# Patient Record
Sex: MALE | Race: WHITE | NOT HISPANIC OR LATINO | URBAN - NONMETROPOLITAN AREA
[De-identification: names, ages, dates, MRNs, and addresses within clinical notes are randomized per-mention and may not be internally consistent; named-entity substitution may affect disease eponyms.]

---

## 2020-08-04 ENCOUNTER — TRANSCRIBE ORDERS (OUTPATIENT)
Dept: ADMINISTRATIVE | Facility: HOSPITAL | Age: 50
End: 2020-08-04

## 2020-08-04 DIAGNOSIS — Z01.818 PREOP EXAMINATION: Primary | ICD-10-CM

## 2020-08-05 ENCOUNTER — LAB (OUTPATIENT)
Dept: LAB | Facility: HOSPITAL | Age: 50
End: 2020-08-05

## 2020-08-05 DIAGNOSIS — Z01.818 PREOP EXAMINATION: ICD-10-CM

## 2020-08-05 PROCEDURE — C9803 HOPD COVID-19 SPEC COLLECT: HCPCS

## 2020-08-05 PROCEDURE — U0004 COV-19 TEST NON-CDC HGH THRU: HCPCS

## 2020-08-05 PROCEDURE — U0002 COVID-19 LAB TEST NON-CDC: HCPCS

## 2020-08-06 LAB
REF LAB TEST METHOD: NORMAL
SARS-COV-2 RNA RESP QL NAA+PROBE: NOT DETECTED

## 2024-08-26 ENCOUNTER — APPOINTMENT (OUTPATIENT)
Dept: GENERAL RADIOLOGY | Facility: HOSPITAL | Age: 54
End: 2024-08-26
Payer: COMMERCIAL

## 2024-08-26 ENCOUNTER — APPOINTMENT (OUTPATIENT)
Dept: CT IMAGING | Facility: HOSPITAL | Age: 54
End: 2024-08-26
Payer: COMMERCIAL

## 2024-08-26 ENCOUNTER — HOSPITAL ENCOUNTER (EMERGENCY)
Facility: HOSPITAL | Age: 54
Discharge: HOME | End: 2024-08-26
Attending: EMERGENCY MEDICINE | Admitting: EMERGENCY MEDICINE
Payer: COMMERCIAL

## 2024-08-26 VITALS
TEMPERATURE: 97.6 F | DIASTOLIC BLOOD PRESSURE: 85 MMHG | HEART RATE: 114 BPM | OXYGEN SATURATION: 96 % | HEIGHT: 72 IN | WEIGHT: 226 LBS | BODY MASS INDEX: 30.61 KG/M2 | RESPIRATION RATE: 18 BRPM | SYSTOLIC BLOOD PRESSURE: 129 MMHG

## 2024-08-26 DIAGNOSIS — I48.0 PAROXYSMAL ATRIAL FIBRILLATION: Primary | ICD-10-CM

## 2024-08-26 LAB
ALBUMIN SERPL-MCNC: 4.3 G/DL (ref 3.5–5.2)
ALBUMIN/GLOB SERPL: 2 G/DL
ALP SERPL-CCNC: 59 U/L (ref 39–117)
ALT SERPL W P-5'-P-CCNC: 22 U/L (ref 1–41)
ANION GAP SERPL CALCULATED.3IONS-SCNC: 9 MMOL/L (ref 5–15)
AST SERPL-CCNC: 15 U/L (ref 1–40)
BASOPHILS # BLD AUTO: 0.03 10*3/MM3 (ref 0–0.2)
BASOPHILS NFR BLD AUTO: 0.5 % (ref 0–1.5)
BILIRUB SERPL-MCNC: 0.2 MG/DL (ref 0–1.2)
BUN SERPL-MCNC: 21 MG/DL (ref 6–20)
BUN/CREAT SERPL: 14.4 (ref 7–25)
CALCIUM SPEC-SCNC: 9.3 MG/DL (ref 8.6–10.5)
CHLORIDE SERPL-SCNC: 102 MMOL/L (ref 98–107)
CO2 SERPL-SCNC: 27 MMOL/L (ref 22–29)
CREAT SERPL-MCNC: 1.46 MG/DL (ref 0.76–1.27)
DEPRECATED RDW RBC AUTO: 45.8 FL (ref 37–54)
EGFRCR SERPLBLD CKD-EPI 2021: 56.4 ML/MIN/1.73
EOSINOPHIL # BLD AUTO: 0.13 10*3/MM3 (ref 0–0.4)
EOSINOPHIL NFR BLD AUTO: 2 % (ref 0.3–6.2)
ERYTHROCYTE [DISTWIDTH] IN BLOOD BY AUTOMATED COUNT: 12.7 % (ref 12.3–15.4)
GLOBULIN UR ELPH-MCNC: 2.1 GM/DL
GLUCOSE SERPL-MCNC: 108 MG/DL (ref 65–99)
HCT VFR BLD AUTO: 46.2 % (ref 37.5–51)
HGB BLD-MCNC: 15.4 G/DL (ref 13–17.7)
HOLD SPECIMEN: NORMAL
IMM GRANULOCYTES # BLD AUTO: 0.02 10*3/MM3 (ref 0–0.05)
IMM GRANULOCYTES NFR BLD AUTO: 0.3 % (ref 0–0.5)
LYMPHOCYTES # BLD AUTO: 2.52 10*3/MM3 (ref 0.7–3.1)
LYMPHOCYTES NFR BLD AUTO: 38.1 % (ref 19.6–45.3)
MCH RBC QN AUTO: 32.6 PG (ref 26.6–33)
MCHC RBC AUTO-ENTMCNC: 33.3 G/DL (ref 31.5–35.7)
MCV RBC AUTO: 97.9 FL (ref 79–97)
MONOCYTES # BLD AUTO: 0.45 10*3/MM3 (ref 0.1–0.9)
MONOCYTES NFR BLD AUTO: 6.8 % (ref 5–12)
NEUTROPHILS NFR BLD AUTO: 3.46 10*3/MM3 (ref 1.7–7)
NEUTROPHILS NFR BLD AUTO: 52.3 % (ref 42.7–76)
NRBC BLD AUTO-RTO: 0 /100 WBC (ref 0–0.2)
NT-PROBNP SERPL-MCNC: 295.8 PG/ML (ref 0–900)
PLATELET # BLD AUTO: 184 10*3/MM3 (ref 140–450)
PMV BLD AUTO: 9.8 FL (ref 6–12)
POTASSIUM SERPL-SCNC: 4.8 MMOL/L (ref 3.5–5.2)
PROT SERPL-MCNC: 6.4 G/DL (ref 6–8.5)
RBC # BLD AUTO: 4.72 10*6/MM3 (ref 4.14–5.8)
SODIUM SERPL-SCNC: 138 MMOL/L (ref 136–145)
TROPONIN T SERPL HS-MCNC: 9 NG/L
WBC NRBC COR # BLD AUTO: 6.61 10*3/MM3 (ref 3.4–10.8)
WHOLE BLOOD HOLD COAG: NORMAL
WHOLE BLOOD HOLD SPECIMEN: NORMAL

## 2024-08-26 PROCEDURE — 71275 CT ANGIOGRAPHY CHEST: CPT

## 2024-08-26 PROCEDURE — 83880 ASSAY OF NATRIURETIC PEPTIDE: CPT | Performed by: EMERGENCY MEDICINE

## 2024-08-26 PROCEDURE — 96361 HYDRATE IV INFUSION ADD-ON: CPT

## 2024-08-26 PROCEDURE — 80053 COMPREHEN METABOLIC PANEL: CPT | Performed by: EMERGENCY MEDICINE

## 2024-08-26 PROCEDURE — 96374 THER/PROPH/DIAG INJ IV PUSH: CPT

## 2024-08-26 PROCEDURE — 85025 COMPLETE CBC W/AUTO DIFF WBC: CPT | Performed by: EMERGENCY MEDICINE

## 2024-08-26 PROCEDURE — 93005 ELECTROCARDIOGRAM TRACING: CPT

## 2024-08-26 PROCEDURE — 84484 ASSAY OF TROPONIN QUANT: CPT | Performed by: EMERGENCY MEDICINE

## 2024-08-26 PROCEDURE — 71045 X-RAY EXAM CHEST 1 VIEW: CPT

## 2024-08-26 PROCEDURE — 99285 EMERGENCY DEPT VISIT HI MDM: CPT

## 2024-08-26 PROCEDURE — 25510000001 IOPAMIDOL PER 1 ML: Performed by: EMERGENCY MEDICINE

## 2024-08-26 PROCEDURE — 25810000003 SODIUM CHLORIDE 0.9 % SOLUTION: Performed by: PHYSICIAN ASSISTANT

## 2024-08-26 PROCEDURE — 93005 ELECTROCARDIOGRAM TRACING: CPT | Performed by: EMERGENCY MEDICINE

## 2024-08-26 RX ORDER — SODIUM CHLORIDE 0.9 % (FLUSH) 0.9 %
10 SYRINGE (ML) INJECTION AS NEEDED
Status: DISCONTINUED | OUTPATIENT
Start: 2024-08-26 | End: 2024-08-27 | Stop reason: HOSPADM

## 2024-08-26 RX ORDER — IOPAMIDOL 755 MG/ML
85 INJECTION, SOLUTION INTRAVASCULAR
Status: COMPLETED | OUTPATIENT
Start: 2024-08-26 | End: 2024-08-26

## 2024-08-26 RX ORDER — METOPROLOL TARTRATE 1 MG/ML
5 INJECTION, SOLUTION INTRAVENOUS ONCE
Status: COMPLETED | OUTPATIENT
Start: 2024-08-26 | End: 2024-08-26

## 2024-08-26 RX ADMIN — APIXABAN 5 MG: 5 TABLET, FILM COATED ORAL at 22:37

## 2024-08-26 RX ADMIN — IOPAMIDOL 85 ML: 755 INJECTION, SOLUTION INTRAVENOUS at 19:05

## 2024-08-26 RX ADMIN — METOPROLOL TARTRATE 5 MG: 5 INJECTION INTRAVENOUS at 20:56

## 2024-08-26 RX ADMIN — SODIUM CHLORIDE 1000 ML: 9 INJECTION, SOLUTION INTRAVENOUS at 20:55

## 2024-08-26 NOTE — ED PROVIDER NOTES
"Subjective  History of Present Illness:    Chief Complaint: Shortness of breath  History of Present Illness: 54-year-old female presents with shortness of breath, he states that he had a colonoscopy performed this morning in [place], he was told that he went into A-fib during the procedure, afterwards he states he was given a dose of metoprolol and aspirin, and discharged home on metoprolol and aspirin.  He states he is gotten progressively more short of breath since being home.  Onset: Sudden  Duration: This morning after colonoscopy  Exacerbating / Alleviating factors: Shortness of breath, with any activity  Associated symptoms: Fatigue      Nurses Notes reviewed and agree, including vitals, allergies, social history and prior medical history.     REVIEW OF SYSTEMS: All systems reviewed and not pertinent unless noted.    Review of Systems   Respiratory:  Positive for chest tightness and shortness of breath.    All other systems reviewed and are negative.      No past medical history on file.    Allergies:    Patient has no known allergies.      No past surgical history on file.      Social History     Socioeconomic History    Marital status:          No family history on file.    Objective  Physical Exam:  /85   Pulse 114   Temp 97.6 °F (36.4 °C) (Oral)   Resp 18   Ht 182.9 cm (72\")   Wt 103 kg (226 lb)   SpO2 96%   BMI 30.65 kg/m²      Physical Exam  Vitals and nursing note reviewed.   Constitutional:       Appearance: He is ill-appearing.   HENT:      Head: Normocephalic and atraumatic.      Mouth/Throat:      Mouth: Mucous membranes are moist.   Eyes:      Extraocular Movements: Extraocular movements intact.   Cardiovascular:      Rate and Rhythm: Normal rate and regular rhythm.   Pulmonary:      Effort: Pulmonary effort is normal.      Breath sounds: Normal breath sounds.   Abdominal:      Palpations: Abdomen is soft.   Musculoskeletal:         General: Normal range of motion.      Cervical " back: Normal range of motion.   Skin:     General: Skin is warm and dry.   Neurological:      Mental Status: He is alert and oriented to person, place, and time.   Psychiatric:         Behavior: Behavior normal.         Thought Content: Thought content normal.         Judgment: Judgment normal.           Procedures    ED Course:    Chest x-ray interpretation by me: No acute cardiopulmonary abnormality    ED Course as of 08/26/24 2311   Mon Aug 26, 2024   2024 CHADS2 Score for Atrial Fibrillation Stroke Risk - MDCalc  Calculated on Aug 26 2024 8:24 PM  1 points -> Intermediate risk of thromboembolic event. 2.8% risk of event per year if no coumadin. The adjusted stroke rate was the expected stroke rate per 100 person-years derived from the multivariable model assuming that aspirin was not taken. [CS]   2053 Review of previous  non ED visits, prior labs, prior imaging, available notes from prior evaluations or visits with specialists, medication list, allergies, past medical history, past surgical history     [CS]   2054 I Personally reviewed all laboratory studies performed in the emergency department  [CS]   2144 Updated the patient and family on all results and read explained the plan including starting on Eliquis, and having patient follow-up with cardiology he understood and agreed, will set up amatory referral, his rate is holding steady mid to upper 90s, he has a prescription for metoprolol which she will start tomorrow [CS]      ED Course User Index  [CS] Kenrick Emmanuel Jr., PHILLIP       Lab Results (last 24 hours)       Procedure Component Value Units Date/Time    TSH [[id number]]  (Normal) Collected: 08/26/24 1219     Updated: 08/26/24 1747    CBC & Differential [[id number]]  (Abnormal) Collected: 08/26/24 1821    Specimen: Blood Updated: 08/26/24 1841    Narrative:      The following orders were created for panel order CBC & Differential.  Procedure                               Abnormality         Status                      ---------                               -----------         ------                     CBC Auto Differential[[id number]]        Abnormal            Final result                 Please view results for these tests on the individual orders.    Comprehensive Metabolic Panel [[id number]]  (Abnormal) Collected: 08/26/24 1821    Specimen: Blood Updated: 08/26/24 1951     Glucose 108 mg/dL      BUN 21 mg/dL      Creatinine 1.46 mg/dL      Sodium 138 mmol/L      Potassium 4.8 mmol/L      Comment: Slight hemolysis detected by analyzer. Result may be falsely elevated.        Chloride 102 mmol/L      CO2 27.0 mmol/L      Calcium 9.3 mg/dL      Total Protein 6.4 g/dL      Albumin 4.3 g/dL      ALT (SGPT) 22 U/L      AST (SGOT) 15 U/L      Alkaline Phosphatase 59 U/L      Total Bilirubin 0.2 mg/dL      Globulin 2.1 gm/dL      Comment: Calculated Result        A/G Ratio 2.0 g/dL      BUN/Creatinine Ratio 14.4     Anion Gap 9.0 mmol/L      eGFR 56.4 mL/min/1.73     Narrative:      GFR Normal >60  Chronic Kidney Disease <60  Kidney Failure <15      BNP [[id number]]  (Normal) Collected: 08/26/24 1821    Specimen: Blood Updated: 08/26/24 1954     proBNP 295.8 pg/mL     Narrative:      This assay is used as an aid in the diagnosis of individuals suspected of having heart failure. It can be used as an aid in the diagnosis of acute decompensated heart failure (ADHF) in patients presenting with signs and symptoms of ADHF to the emergency department (ED). In addition, NT-proBNP of <300 pg/mL indicates ADHF is not likely.    Age Range Result Interpretation  NT-proBNP Concentration (pg/mL:      <50             Positive            >450                   Gray                 300-450                    Negative             <300    50-75           Positive            >900                  Gray                300-900                  Negative            <300      >75             Positive            >1800                  Veloz                 300-1800                  Negative            <300    Single High Sensitivity Troponin T [[id number]]  (Normal) Collected: 08/26/24 1821    Specimen: Blood Updated: 08/26/24 1954     HS Troponin T 9 ng/L     Narrative:      High Sensitive Troponin T Reference Range:  <14.0 ng/L- Negative Female for AMI  <22.0 ng/L- Negative Male for AMI  >=14 - Abnormal Female indicating possible myocardial injury.  >=22 - Abnormal Male indicating possible myocardial injury.   Clinicians would have to utilize clinical acumen, EKG, Troponin, and serial changes to determine if it is an Acute Myocardial Infarction or myocardial injury due to an underlying chronic condition.         CBC Auto Differential [[id number]]  (Abnormal) Collected: 08/26/24 1821    Specimen: Blood Updated: 08/26/24 1841     WBC 6.61 10*3/mm3      RBC 4.72 10*6/mm3      Hemoglobin 15.4 g/dL      Hematocrit 46.2 %      MCV 97.9 fL      MCH 32.6 pg      MCHC 33.3 g/dL      RDW 12.7 %      RDW-SD 45.8 fl      MPV 9.8 fL      Platelets 184 10*3/mm3      Neutrophil % 52.3 %      Lymphocyte % 38.1 %      Monocyte % 6.8 %      Eosinophil % 2.0 %      Basophil % 0.5 %      Immature Grans % 0.3 %      Neutrophils, Absolute 3.46 10*3/mm3      Lymphocytes, Absolute 2.52 10*3/mm3      Monocytes, Absolute 0.45 10*3/mm3      Eosinophils, Absolute 0.13 10*3/mm3      Basophils, Absolute 0.03 10*3/mm3      Immature Grans, Absolute 0.02 10*3/mm3      nRBC 0.0 /100 WBC              CT Angiogram Chest Pulmonary Embolism    Result Date: 8/26/2024  CT ANGIOGRAM CHEST PULMONARY EMBOLISM Date of Exam: 8/26/2024 7:00 PM EDT Indication: new onset afib, soa. Comparison: None available. Technique: Axial CT images were obtained of the chest after the uneventful intravenous administration of 85 mL Isovue-370 utilizing pulmonary embolism protocol.  Reconstructed coronal and sagittal images were also obtained. Automated exposure control and  iterative construction methods were used.  Findings: Normal appearance of the pulmonary arteries without evidence of pulmonary embolism. Unremarkable appearance of the cardiac chambers. Unremarkable appearance of the thoracic aorta. There are scattered coronary artery calcifications. No pericardial effusion. No discrete filling defect of the left atrial appendage. No thoracic adenopathy. There is a calcified subcarinal lymph node compatible with sequelae of healed granulomatous disease. Homogeneous attenuation of the thyroid gland. Unremarkable appearance of the chest wall soft tissues. The trachea and mainstem bronchi are patent. The smaller peripheral airways are unremarkable. The lungs are clear. No lung mass or nodule. No pleural effusion or pneumothorax. No pulmonary edema. There are couple scattered small calcified pulmonary granulomata. No acute or suspicious bony findings. Unremarkable appearance of the upper abdomen.     Impression: Impression: No evidence of pulmonary embolism. No acute intrathoracic findings. Electronically Signed: Adeel Araujo MD  2024 8:15 PM EDT  Workstation ID: [id number]    XR Chest 1 View    Result Date: 2024  XR CHEST 1 VW Date of Exam: 2024 5:55 PM EDT Indication: SOA triage protocol Comparison: None available. Findings: There is a small linear opacity at the left lateral lower lung suggestive of atelectasis or scarring. Otherwise the lungs are clear. Normal cardiomediastinal silhouette. No pleural effusion. No pneumothorax. No acute osseous findings. No jaymie pulmonary edema.     Impression: Impression: No acute cardiopulmonary findings. Electronically Signed: Adeel Araujo MD  2024 6:26 PM EDT  Workstation ID: [id number]    ECHO COMPLETE (DOPPLER / COLOR) W OR WO CONTRAST    Result Date: 2024  TRANSTHORACIC ECHOCARDIOGRAPHY REPORT  Demographics  Patient Name:               ANSHUL MENA         :     1970  Medical Record Number:      [id number]            Age:     54 year(s)  Corporate  ID Number:        [id number]            Gender   Male  Account Number:             [id number]  Sonographer:                Terrell GILL      Height:  72 inches  Referring Physician:        JACINTO FARAH      Weight:  227.01 pounds  Interpreting Physician:     Gilda Valle MD    BMI:     30.79 kg/m^2  Date of Service:            08/26/2024  Room Number:                4061 Type of Study:  TTE procedure: ECHO COMPLETE (DOPPLER / COLOR) W OR WO CONTRAST.  History/Tech Notes:  Technically difficult study due to .poor windows  Impression:  Technically difficult study due to poor acoustic window.  Normal sized left ventricle.  Moderate, concentric, left ventricular hypertrophy.  Normal left ventricular systolic function with EF of 60-65%  Indeterminate diastolic function due to Afib  Normal sized right ventricle.  Normal right ventricular function.  No hemodynamically significant valvular heart disease. Measurements Summary:  LVEDd: 4.4 cm          LVESd: 2.8 cm           IVSEd: 1.4 cm  AO Root:3.1 cm         LVPWd: 1.5 cm  Left Ventricle  Peak E-wave: 0.78 m/s         Volume jeurpptcu47.2 ml  E' Velocity: 0.1 m/s          Volume pkkerbzu97 ml  LVOT diameter: 2.1 cm  Normal sized left ventricle.  Moderate, concentric, left ventricular hypertrophy.  Normal left ventricular systolic function with EF of 60-65%  Indeterminate diastolic function due to Afib  Right Ventricle  Diastolic dimension: 2.5 cm   RV systolic pressure: 26.65 mmHg  Normal sized right ventricle.  Normal right ventricular function  Left Atrium  LA area: 17.2 cm^2                  LA volume:50 ml  LA dimension: 3.7 cm                LA/Aorta: 1.19  Normal sized left atrium.  Right Atrium  Normal sized right atrium.  Mitral Valve  Area PHT: 3.79 cm^2              Mean velocity: 0.5 m/s  P1/2t: 58 msec                   Mean gradient: 1 mmHg  Area (continuity): 2.37 cm^2     Peak gradient: 2 mmHg  Mild mitral valve annulus calcification.  Trace  mitral regurgitation.  No mitral stenosis.  Aortic Valve  AV VTI: 17.7 cm     Area continuity: 2.33 Peak velocity: 0.83  LVOT VTI: 11.9 cm   cm^2                  m/s  Cusp separation:    Mean velocity: 0.57   Peak gradient: 2.74  2.2 cm              m/s                   mmHg  Mean gradient: 2  mmHg  Three cusped aortic valve  No aortic regurgitation.  No aortic stenosis.  Tricuspid Valve  TR velocity: 2.04 m/s       TR gradient: 16.6464 mmHg  Estimated RAP: 10 mmHg      RVSP: 27 mmHg  Structurally normal tricuspid valve.  Trace tricuspid regurgitation.  No tricuspid stenosis.  Pulmonic Valve  PASP: 26.65 mmHg  Pulmonic valve not well visualized. Great Vessels Aorta  Aortic Root: 3.1 cm  LVOT Diameter: 2.1 cm Normal IVC with appropriate collapse.  Pericardium / Pleura No pericardial effusion.  ----------------------------------------------------------------  Electronically signed by Gilda Valle MD(Interpreting  Physician) on 08/26/2024 14:44  ----------------------------------------------------------------        Medical Decision Making  Patient Presentation 54-year-old male presented in A-fib, that occurred earlier while he had a colonoscopy at an outside hospital.  He presented with shortness of breath and tachycardia    DDX A-fib with RVR, pulmonary embolism, pneumonia, pleural effusion, pulmonary edema    Data Review/ Non ED Records /Analysis/Ordering unique tests  Review of previous  non ED visits, prior labs, prior imaging, available notes from prior evaluations or visits with specialists, medication list, allergies, past medical history, past surgical history        Independent Review Studies  I Personally reviewed all laboratory studies performed in the emergency department     Intervention/Re-evaluation intervention included fluids, and IV metoprolol reevaluation patient remained stable    Independent Clinician discussed with my supervising physician Dr. Dominguez    Risk Stratification tools/clinical  decision rules patient presented with shortness of breath, new onset A-fib, EKG was consistent with A-fib with RVR, electrolytes were drawn, and stable as well as his chest x-ray, he was given metoprolol IV to help control his rate, his CHADS2 score is 1 he was started on Eliquis and given appropriate amatory referral to cardiology.    Shared Decision Making discussed all findings with patient and family they were agreeable    Disposition patient stable for discharge    Problems Addressed:  Paroxysmal atrial fibrillation: complicated acute illness or injury    Amount and/or Complexity of Data Reviewed  External Data Reviewed: labs and notes.  Labs: ordered. Decision-making details documented in ED Course.  Radiology: ordered and independent interpretation performed. Decision-making details documented in ED Course.  ECG/medicine tests: ordered.    Risk  Prescription drug management.          Final diagnoses:   Paroxysmal atrial fibrillation           Disposition: DISCHARGE    Patient discharged in stable condition.    Reviewed implications of results, diagnosis, meds, responsibility to follow up, warning signs and symptoms of possible worsening, potential complications and reasons to return to ER.    Patient/Family voiced understanding of above instructions.    Discussed plan for discharge, as there is no emergent indication for admission.  Pt/family is agreeable and understands need for follow up and possible repeat testing.  Pt/family is aware that discharge does not mean that nothing is wrong but that it indicates no emergency is currently present that requires admission and they must continue care with follow-up as given below or with a physician of their choice.     FOLLOW-UP  [facility] CARDIOLOG[facility]  [address]  [address]  [facility] [id number] [phone]             Medication List        New Prescriptions      apixaban 5 MG tablet tablet  Commonly known as: ELIQUIS  Take 1  tablet by mouth Every 12 (Twelve) Hours for 30 days.  Start taking on: August 27, 2024               Where to Get Your Medications        These medications were sent to [facility] [id number] - [place], [place] - [address] [place] AT [facility] - [phone]  - [phone]   [address], [facility] [address]      Phone: [phone]   apixaban 5 MG tablet tablet             Kenrick Emmanuel Jr. PAClayC  08/26/24 2428

## 2024-08-27 LAB
QT INTERVAL: 278 MS
QTC INTERVAL: 386 MS

## 2024-08-28 ENCOUNTER — OFFICE VISIT (OUTPATIENT)
Dept: CARDIOLOGY | Facility: HOSPITAL | Age: 54
End: 2024-08-28
Payer: COMMERCIAL

## 2024-08-28 ENCOUNTER — HOSPITAL ENCOUNTER (OUTPATIENT)
Dept: CARDIOLOGY | Facility: HOSPITAL | Age: 54
Discharge: HOME | End: 2024-08-28
Payer: COMMERCIAL

## 2024-08-28 VITALS
OXYGEN SATURATION: 97 % | HEIGHT: 72 IN | BODY MASS INDEX: 31.29 KG/M2 | HEART RATE: 98 BPM | SYSTOLIC BLOOD PRESSURE: 118 MMHG | WEIGHT: 231 LBS | TEMPERATURE: 97 F | DIASTOLIC BLOOD PRESSURE: 60 MMHG | RESPIRATION RATE: 20 BRPM

## 2024-08-28 DIAGNOSIS — R07.89 CHEST PRESSURE: ICD-10-CM

## 2024-08-28 DIAGNOSIS — R06.83 SNORING: ICD-10-CM

## 2024-08-28 DIAGNOSIS — I48.91 NEW ONSET A-FIB: Primary | ICD-10-CM

## 2024-08-28 DIAGNOSIS — I48.91 NEW ONSET A-FIB: ICD-10-CM

## 2024-08-28 DIAGNOSIS — R79.89 ELEVATED SERUM CREATININE: ICD-10-CM

## 2024-08-28 DIAGNOSIS — R06.02 SHORTNESS OF BREATH: ICD-10-CM

## 2024-08-28 PROCEDURE — 93242 EXT ECG>48HR<7D RECORDING: CPT

## 2024-08-28 RX ORDER — DIVALPROEX SODIUM 500 MG/1
1000 TABLET, DELAYED RELEASE ORAL
COMMUNITY
Start: 2024-05-09

## 2024-08-28 RX ORDER — CHLORAL HYDRATE 500 MG
CAPSULE ORAL DAILY
COMMUNITY

## 2024-08-28 RX ORDER — LEVETIRACETAM 1000 MG/1
1000 TABLET ORAL
COMMUNITY
Start: 2024-05-09

## 2024-08-28 RX ORDER — MULTIVIT WITH MINERALS/LUTEIN
1000 TABLET ORAL DAILY
COMMUNITY

## 2024-08-28 RX ORDER — GABAPENTIN 600 MG/1
600 TABLET ORAL 3 TIMES DAILY
COMMUNITY
Start: 2024-08-19

## 2024-08-28 RX ORDER — LANOLIN ALCOHOL/MO/W.PET/CERES
400 CREAM (GRAM) TOPICAL DAILY
COMMUNITY

## 2024-08-28 RX ORDER — TESTOSTERONE 20.25 MG/1.25G
GEL TOPICAL
COMMUNITY
Start: 2024-07-23

## 2024-08-28 RX ORDER — PANTOPRAZOLE SODIUM 40 MG/1
40 TABLET, DELAYED RELEASE ORAL DAILY
COMMUNITY
Start: 2024-06-18

## 2024-08-28 RX ORDER — SUCRALFATE 1 G/1
1 TABLET ORAL 4 TIMES DAILY
COMMUNITY
Start: 2024-08-26 | End: 2024-10-25

## 2024-08-28 RX ORDER — HYOSCYAMINE SULFATE 0.125 MG
TABLET ORAL
COMMUNITY
Start: 2024-08-17

## 2024-08-28 RX ORDER — UBIDECARENONE 50 MG
50 CAPSULE ORAL DAILY
COMMUNITY

## 2024-08-28 RX ORDER — CETIRIZINE HYDROCHLORIDE 5 MG/1
5 TABLET ORAL DAILY
COMMUNITY

## 2024-08-28 RX ORDER — LISINOPRIL 10 MG/1
10 TABLET ORAL DAILY
COMMUNITY
Start: 2024-05-09

## 2024-08-28 RX ORDER — ATORVASTATIN CALCIUM 10 MG/1
10 TABLET, FILM COATED ORAL DAILY
COMMUNITY

## 2024-08-28 RX ORDER — TRAZODONE HYDROCHLORIDE 300 MG/1
150 TABLET ORAL DAILY
COMMUNITY

## 2024-08-28 RX ORDER — METOPROLOL TARTRATE 25 MG/1
25 TABLET, FILM COATED ORAL
COMMUNITY
Start: 2024-08-27 | End: 2025-08-27

## 2024-08-28 RX ORDER — ALPRAZOLAM 1 MG
1 TABLET ORAL 3 TIMES DAILY
COMMUNITY
Start: 2024-05-09

## 2024-08-28 NOTE — PROGRESS NOTES
[facility] Heart Monitor Documentation    Sheng Dominguez  1970  [id number]  08/28/24      [] ZIO XT Patch  Model [id number] Prescribed for  Days    Serial Number: (N + 9 Digits) N   Apply-By Date on Box:   USPS Tracking Number:   USPS Tracking        [] [employer] MINI PLUS Mobile Cardiac Telemetry  Model BGMINIPLUS Prescribed for  Days    Serial Number: (BGM + 7 Digits) BGM  Shipped-By Date on Box:   UPS Tracking Number: [id number]  UPS Tracking      [x] [employer] MINI Holter Monitor  Model BGMINIEL Prescribed for 14 Days    Serial Number: (7 Digits) [id number]  Shipped-By Date on Box: 08/14/2024  UPS Tracking Number: [id number]  UPS Tracking        This monitor was applied to the patient's chest and checked for proper functioning.  Mr. Sheng Dominguez was instructed in the proper use of this monitor.  He was given the opportunity to ask questions and left the office with the device 's instruction manual.    Babs Rosado, [facility], 15:52 EDT, 08/28/24                  [facility] 8.8.2019

## 2024-08-28 NOTE — PATIENT INSTRUCTIONS
Please continue your Eliquis 5 mg twice day (Stroke prevention)    Cntinue your Metoprolol 25 mg twiced (Heart rate medications)    You will be called to schedule your stress test.    You will be call to schedule your follow up with cardiology    You will be placed in a heart monitor today to see if you are staying in afib on your medications.    We will schedule a follow up with sleep medicine to see if you have sleep apnea.

## 2024-08-28 NOTE — PROGRESS NOTES
"[facility], [facility] Heart and Vascular    Chief Complaint  Atrial Fibrillation    Subjective    History of Present Illness {CC  Problem List  Visit  Diagnosis   Encounters  Notes  Medications  Labs  Result Review Imaging  Media :23}     Sheng Dominguez presents to [facility] CARDIOLOGY for   History of Present Illness     54-year-old male with anxiety, seizure disorder, HTN, GERD, HLP, memory impairment, snoring.     He presented to [facility] ED on 8/26/2024. Patient had a colonoscopy at outlying facility and developed shortness of breath.  Was told he went into atrial fibrillation during the procedure.    EKG in the emergency room showed atrial fibrillation at 116 bpm    Pt reports hx of intermittent palpitations with dyspnea and chest discomfort for years.     Initiated on metoprolol and Eliquis    Echocardiogram 8/26/2024: EF 60 to 65%, normal significant valvular disease    Pt started BB today.  Pt reported continued intermittent dyspnea, chest pressure.    Family hx of Afib among multiple family members, \"heart disease\", CVA's/    Objective     Vital Signs:   Vitals:    08/28/24 1508 08/28/24 1509   BP: 120/68 118/60   BP Location: Left arm Left arm   Patient Position: Standing Sitting   Cuff Size: Adult Adult   Pulse: 75 98   Resp:  20   Temp: 97 °F (36.1 °C) 97 °F (36.1 °C)   TempSrc: Temporal Temporal   SpO2: 96% 97%   Weight:  105 kg (231 lb)   Height:  182.9 cm (72\")     Body mass index is 31.33 kg/m².  Physical Exam  Vitals reviewed.   Constitutional:       General: He is not in acute distress.     Appearance: Normal appearance.   Neck:      Vascular: No carotid bruit.   Cardiovascular:      Rate and Rhythm: Normal rate. Rhythm irregular.      Pulses:           Radial pulses are 2+ on the right side and 2+ on the left side.        Dorsalis pedis pulses are 2+ on the right side and 2+ on the left side.        Posterior tibial pulses are 2+ on the " right side and 2+ on the left side.      Heart sounds: Normal heart sounds. No murmur heard.  Pulmonary:      Effort: Pulmonary effort is normal.      Breath sounds: Normal breath sounds.   Musculoskeletal:      Right lower leg: No edema.      Left lower leg: No edema.   Skin:     General: Skin is warm and dry.   Neurological:      Mental Status: He is alert.   Psychiatric:         Mood and Affect: Mood normal.         Behavior: Behavior is cooperative.              Result Review  Data Reviewed:{ Labs  Result Review  Imaging  Med Tab  Media :23}   EKG 8/26/2024: Atrial fibrillation with RVR    CT angiogram of chest 8/26/2024: No PE    Chest x-ray 8/26/2024: No acute cardiopulmonary findings.    Echocardiogram 8/26/2024: EF 60 to 65%, normal significant valvular disease               Assessment and Plan {CC Problem List  Visit Diagnosis  ROS  Review (Popup)  Health Maintenance  Quality  BestPractice  Medications  SmartSets  SnapShot Encounters  Media :23}   1. New onset a-fib  Duration unknown  Continue BB and Eliquis (with new onset continue at this time pending POC)    Samples:  Eliquis 5 mg #4, Lot:  [id number], Exp:  01/26    - Holter Monitor - 72 Hour Up To 15 Days; Future  To assess burden      - Stress Test With Myocardial Perfusion (1 Day); Future    - Ambulatory Referral to Sleep Medicine    A. fib education completed: What is atrial fibrillation, causes, triggers, signs and symptoms, medication management (rate control versus rhythm control) and stroke prevention, procedural management and indications, and the role of the atrial fibrillation center and when to call.    CHADS-VASc Risk Assessment               1 Total Score    1 Hypertension    Criteria that do not apply:    CHF    Age >/= 75    DM    PRIOR STROKE/TIA/THROMBO    Vascular Disease    Age 65-74    Sex: Female          We will assess burden for atrial fibrillation on beta-blocker.  If symptoms continue will consider JORDY  "cardioversion.  Patient is interested in a pulmonary vein ablation would like to be evaluated and discuss further with electrophysiology.  Referral completed.    2. Chest pressure  Hx of HTN, HLP, new afib with dyspnea and chest pressure, family hx of \"heart diease\"    - Stress Test With Myocardial Perfusion (1 Day); Future    3. Shortness of breath  Recently echo with normal EF and no significant VHD  - Stress Test With Myocardial Perfusion (1 Day); Future    4. Elevated serum creatinine  In setting colonoscopy  Repeat labs in 2 weeks  - Basic Metabolic Panel; Future    5. Snoring  ? Sleep apnea  - Ambulatory Referral to Sleep Medicine    Patient will follow-up with cardiology to be scheduled.  Follow-up in the heart valve center as needed or as determined by cardiology.    I spent 45 minutes caring for Sheng on this date of service. This time includes time spent by me in the following activities:preparing for the visit, reviewing tests, obtaining and/or reviewing a separately obtained history, performing a medically appropriate examination and/or evaluation , counseling and educating the patient/family/caregiver, ordering medications, tests, or procedures, referring and communicating with other health care professionals , documenting information in the medical record, independently interpreting results and communicating that information with the patient/family/caregiver, and care coordination    Follow Up {Instructions Charge Capture  Follow-up Communications :23}   Return if symptoms worsen or fail to improve.    Patient was given instructions and counseling regarding his condition or for health maintenance advice. Please see specific information pulled into the AVS if appropriate.  Patient was instructed to call the Heart and Valve Center with any questions, concerns, or worsening symptoms.  "

## 2024-09-04 ENCOUNTER — TELEPHONE (OUTPATIENT)
Dept: CARDIOLOGY | Facility: CLINIC | Age: 54
End: 2024-09-04
Payer: COMMERCIAL

## 2024-09-04 NOTE — TELEPHONE ENCOUNTER
Caller: Sheng Dominguez    Relationship to patient: Self    Best call back number: [phone] (home)      Chief complaint: PATIENT IS SCHEDULED FOR AN APPOINTMENT ON 10.08.24, HE WILL BE LOSING HIS INSURANCE COVERAGE ON 10.01.24. PATIENT WOULD LIKE TO COME IN EARLIER TO SEE THE DR BEFORE HIS INSURANCE LAPSES. PLEASE REACH OUT TO THE PATIENT TO ADDRESS THIS.     Type of visit: NEW PT    Requested date: BEFORE 10.01.24    If rescheduling, when is the original appointment: 10.08.24    Additional notes:

## 2024-09-06 ENCOUNTER — OFFICE VISIT (OUTPATIENT)
Dept: SLEEP MEDICINE | Facility: CLINIC | Age: 54
End: 2024-09-06
Payer: COMMERCIAL

## 2024-09-06 VITALS
OXYGEN SATURATION: 97 % | BODY MASS INDEX: 31.29 KG/M2 | HEART RATE: 92 BPM | WEIGHT: 231 LBS | TEMPERATURE: 98.6 F | HEIGHT: 72 IN | DIASTOLIC BLOOD PRESSURE: 70 MMHG | SYSTOLIC BLOOD PRESSURE: 122 MMHG

## 2024-09-06 DIAGNOSIS — G47.19 EXCESSIVE DAYTIME SLEEPINESS: ICD-10-CM

## 2024-09-06 DIAGNOSIS — R29.818 SUSPECTED SLEEP APNEA: ICD-10-CM

## 2024-09-06 DIAGNOSIS — R06.89 GASPING FOR BREATH: ICD-10-CM

## 2024-09-06 DIAGNOSIS — I48.91 ATRIAL FIBRILLATION, UNSPECIFIED TYPE: ICD-10-CM

## 2024-09-06 DIAGNOSIS — R56.9 SEIZURES: ICD-10-CM

## 2024-09-06 DIAGNOSIS — I10 ESSENTIAL HYPERTENSION: Primary | ICD-10-CM

## 2024-09-06 PROCEDURE — 99213 OFFICE O/P EST LOW 20 MIN: CPT | Performed by: NURSE PRACTITIONER

## 2024-09-06 PROCEDURE — 1160F RVW MEDS BY RX/DR IN RCRD: CPT | Performed by: NURSE PRACTITIONER

## 2024-09-06 PROCEDURE — 1159F MED LIST DOCD IN RCRD: CPT | Performed by: NURSE PRACTITIONER

## 2024-09-06 NOTE — PROGRESS NOTES
Chief Complaint:   Chief Complaint   Patient presents with    Sleeping Problem    Snoring    Fatigue       HPI:    Sheng Dominguez is a 54 y.o. male here to establish care.  Patient sees Ms. Jair PASCUAL and Ms. Natalio PASCUAL for care.  Patient has medical history as below:  Past Medical History:   Diagnosis Date    Anxiety     Atrial fibrillation     Hyperlipidemia     Hypertension     Muscle pain            Patient states he had a colonoscopy approximately 3 weeks ago that did show atrial fibrillation.  He did see cardiology and now has on a monitor.  He is here today to see if sleep apnea does have a role in this.  Patient states he also has snoring and nighttime gasping, and excessive daytime sleepiness..  He does have a history of seizures but is unsure if he has any of these at night.  Patient has no history of nasal fracture or hypnagogue hallucinations or sleep paralysis.  He has had a concussion in the past.    Patient does take trazodone and will go to sleep anywhere between 30 minutes and 1 hour.  He goes to bed at 10 or 11 PM getting up at 6 or 7 AM.  He will usually keep the same sleep schedule as his wife.  Patient does have [place] score of 5/24.    We did speak today about the consequences of untreated sleep apnea such as hypertension, atrial fibrillation, stroke, early onset dementia and sudden cardiac death.  We also discussed different therapies that would be available to him such as CPAP, MAD, or ENT referral.  Patient verbalizes understanding.    Social history  This is a very pleasant 54-year-old male who presents today with his friend.  He is not currently working.  He is a non-smoker, nondrinker and denies illicit substance use.  He will have 1 to 2 cups of coffee daily.    Family History   Problem Relation Age of Onset    Hypertension Mother     Cancer Father     Heart disease Sister     Heart disease Brother     Heart disease Maternal Grandmother     Heart disease Maternal Grandfather     Cancer  Paternal Grandmother     Cancer Paternal Grandfather     Heart disease Maternal Aunt      Past Surgical History:   Procedure Laterality Date    HAND SURGERY      SMALL INTESTINE SURGERY      TONSILLECTOMY         Current medications are:   Current Outpatient Medications:     ALPRAZolam (XANAX) 1 MG tablet, Take 1 tablet by mouth 3 (Three) Times a Day., Disp: , Rfl:     apixaban (ELIQUIS) 5 MG tablet tablet, Take 1 tablet by mouth Every 12 (Twelve) Hours., Disp: 60 tablet, Rfl: 6    atorvastatin (LIPITOR) 10 MG tablet, Take 1 tablet by mouth Daily., Disp: , Rfl:     cetirizine (zyrTEC) 5 MG tablet, Take 1 tablet by mouth Daily., Disp: , Rfl:     coenzyme Q10 50 MG capsule capsule, Take 1 capsule by mouth Daily., Disp: , Rfl:     divalproex (DEPAKOTE) 500 MG DR tablet, Take 2 tablets by mouth., Disp: , Rfl:     gabapentin (NEURONTIN) 600 MG tablet, Take 1 tablet by mouth 3 (Three) Times a Day., Disp: , Rfl:     hyoscyamine (ANASPAZ,LEVSIN) 0.125 MG tablet, , Disp: , Rfl:     levETIRAcetam (KEPPRA) 1000 MG tablet, Take 1 tablet by mouth., Disp: , Rfl:     lisinopril (PRINIVIL,ZESTRIL) 10 MG tablet, Take 1 tablet by mouth Daily., Disp: , Rfl:     Magnesium Oxide -Mg Supplement 400 (240 Mg) MG tablet, Take 1 tablet by mouth Daily., Disp: , Rfl:     metoprolol tartrate (LOPRESSOR) 25 MG tablet, Take 1 tablet by mouth., Disp: , Rfl:     Omega-3 1000 MG capsule, Take  by mouth Daily., Disp: , Rfl:     pantoprazole (PROTONIX) 40 MG EC tablet, Take 1 tablet by mouth Daily., Disp: , Rfl:     sucralfate (CARAFATE) 1 g tablet, Take 1 tablet by mouth 4 (Four) Times a Day., Disp: , Rfl:     Testosterone 1.62 % gel, , Disp: , Rfl:     traZODone (DESYREL) 300 MG tablet, Take 0.5 tablets by mouth Daily., Disp: , Rfl:     vitamin C (ASCORBIC ACID) 250 MG tablet, Take 4 tablets by mouth Daily., Disp: , Rfl: .      The patient's relevant past medical, surgical, family and social history were reviewed and updated in Epic as  appropriate.       Review of Systems   Constitutional:  Positive for fatigue.   HENT:  Positive for congestion, dental problem, hearing loss, postnasal drip and tinnitus.    Eyes:  Positive for visual disturbance.   Respiratory:  Positive for chest tightness, shortness of breath and wheezing.    Cardiovascular:  Positive for chest pain and palpitations.   Gastrointestinal:  Positive for diarrhea.        Heartburn     Musculoskeletal:  Positive for arthralgias and back pain.   Allergic/Immunologic: Positive for environmental allergies.   Neurological:  Positive for dizziness, seizures, speech difficulty, weakness, light-headedness and headaches.        Short term memory loss   Psychiatric/Behavioral:  Positive for confusion, decreased concentration, dysphoric mood and sleep disturbance. The patient is nervous/anxious.    All other systems reviewed and are negative.        Objective:    Physical Exam  Constitutional:       Appearance: Normal appearance.   HENT:      Head: Normocephalic and atraumatic.      Mouth/Throat:      Mouth: Mucous membranes are moist.      Pharynx: Oropharynx is clear.      Comments: Mallampati 4 anatomy  Cardiovascular:      Rate and Rhythm: Normal rate and regular rhythm.   Pulmonary:      Effort: Pulmonary effort is normal.      Breath sounds: Normal breath sounds.   Skin:     General: Skin is warm and dry.   Neurological:      Mental Status: He is alert.   Psychiatric:         Mood and Affect: Mood normal.         Behavior: Behavior normal.         Thought Content: Thought content normal.         Judgment: Judgment normal.               ASSESSMENT/PLAN    Diagnoses and all orders for this visit:    1. Essential hypertension (Primary)  -     Polysomnography 4 or More Parameters; Future    2. Atrial fibrillation, unspecified type  -     Polysomnography 4 or More Parameters; Future    3. Seizures  -     Polysomnography 4 or More Parameters; Future    4. Gasping for breath  -      Polysomnography 4 or More Parameters; Future    5. Suspected sleep apnea  -     Polysomnography 4 or More Parameters; Future    6. Excessive daytime sleepiness  -     Polysomnography 4 or More Parameters; Future        Counseled patient regarding multimodal approach with healthy nutrition, healthy sleep, regular physical activity, social activities, counseling, and medications. Encouraged to practice lateral sleep position. Avoid alcohol and sedatives close to bedtime.    Patient certainly has a strong story for sleep apnea and due to the consequences of untreated sleep apnea we will move forward with in lab study with seizure montage.    Thank you for this kind referral.      Signed by  SAMARA Robison    September 6, 2024      CC: Tracey Adam APRN Clark, Jessalynn, APRN

## 2024-09-09 NOTE — TELEPHONE ENCOUNTER
SW PT VIA PHONE HE IS AWARE DR. LAUREANO DOES NOT HAVE ANYTHING BEFORE 10/1/24. HE DID NOT WANT TO CANCEL APPT YET BECAUSE HE IS STILL DECIDING IF HE WANTS TO BE SELF PAY OR NOT

## 2024-09-17 ENCOUNTER — HOSPITAL ENCOUNTER (EMERGENCY)
Facility: HOSPITAL | Age: 54
Discharge: HOME | End: 2024-09-17
Attending: EMERGENCY MEDICINE | Admitting: EMERGENCY MEDICINE
Payer: COMMERCIAL

## 2024-09-17 ENCOUNTER — HOSPITAL ENCOUNTER (OUTPATIENT)
Dept: CARDIOLOGY | Facility: HOSPITAL | Age: 54
Discharge: HOME | End: 2024-09-17
Admitting: NURSE PRACTITIONER
Payer: COMMERCIAL

## 2024-09-17 ENCOUNTER — APPOINTMENT (OUTPATIENT)
Dept: GENERAL RADIOLOGY | Facility: HOSPITAL | Age: 54
End: 2024-09-17
Payer: COMMERCIAL

## 2024-09-17 VITALS
DIASTOLIC BLOOD PRESSURE: 87 MMHG | TEMPERATURE: 98 F | HEIGHT: 72 IN | SYSTOLIC BLOOD PRESSURE: 130 MMHG | RESPIRATION RATE: 16 BRPM | OXYGEN SATURATION: 96 % | HEART RATE: 90 BPM | BODY MASS INDEX: 31.35 KG/M2 | WEIGHT: 231.48 LBS

## 2024-09-17 VITALS
BODY MASS INDEX: 31.35 KG/M2 | WEIGHT: 231.48 LBS | HEIGHT: 72 IN | SYSTOLIC BLOOD PRESSURE: 114 MMHG | DIASTOLIC BLOOD PRESSURE: 96 MMHG | HEART RATE: 76 BPM

## 2024-09-17 DIAGNOSIS — R07.9 CHEST PAIN, UNSPECIFIED TYPE: Primary | ICD-10-CM

## 2024-09-17 DIAGNOSIS — R06.02 SHORTNESS OF BREATH: ICD-10-CM

## 2024-09-17 DIAGNOSIS — R07.89 CHEST PRESSURE: ICD-10-CM

## 2024-09-17 DIAGNOSIS — I48.91 NEW ONSET A-FIB: ICD-10-CM

## 2024-09-17 LAB
ALBUMIN SERPL-MCNC: 4.7 G/DL (ref 3.5–5.2)
ALBUMIN/GLOB SERPL: 2.5 G/DL
ALP SERPL-CCNC: 58 U/L (ref 39–117)
ALT SERPL W P-5'-P-CCNC: 21 U/L (ref 1–41)
ANION GAP SERPL CALCULATED.3IONS-SCNC: 10 MMOL/L (ref 5–15)
AST SERPL-CCNC: 12 U/L (ref 1–40)
BASOPHILS # BLD AUTO: 0.03 10*3/MM3 (ref 0–0.2)
BASOPHILS NFR BLD AUTO: 0.5 % (ref 0–1.5)
BH CV REST NUCLEAR ISOTOPE DOSE: 9.8 MCI
BH CV STRESS BP STAGE 2: NORMAL
BH CV STRESS BP STAGE 4: NORMAL
BH CV STRESS COMMENTS STAGE 1: NORMAL
BH CV STRESS DOSE REGADENOSON STAGE 1: 0.4
BH CV STRESS DURATION MIN STAGE 1: 1
BH CV STRESS DURATION MIN STAGE 2: 1
BH CV STRESS DURATION MIN STAGE 3: 1
BH CV STRESS DURATION MIN STAGE 4: 1
BH CV STRESS DURATION SEC STAGE 1: 0
BH CV STRESS DURATION SEC STAGE 2: 0
BH CV STRESS DURATION SEC STAGE 3: 0
BH CV STRESS DURATION SEC STAGE 4: 0
BH CV STRESS HR STAGE 1: 91
BH CV STRESS HR STAGE 2: 106
BH CV STRESS HR STAGE 3: 87
BH CV STRESS HR STAGE 4: 90
BH CV STRESS O2 STAGE 1: 99
BH CV STRESS O2 STAGE 2: 100
BH CV STRESS O2 STAGE 3: 100
BH CV STRESS O2 STAGE 4: 100
BH CV STRESS PROTOCOL 1: NORMAL
BH CV STRESS RECOVERY BP: NORMAL MMHG
BH CV STRESS RECOVERY HR: 86 BPM
BH CV STRESS RECOVERY O2: 99 %
BH CV STRESS STAGE 1: 1
BH CV STRESS STAGE 2: 2
BH CV STRESS STAGE 3: 3
BH CV STRESS STAGE 4: 4
BILIRUB SERPL-MCNC: 0.4 MG/DL (ref 0–1.2)
BUN SERPL-MCNC: 16 MG/DL (ref 6–20)
BUN/CREAT SERPL: 18.4 (ref 7–25)
CALCIUM SPEC-SCNC: 9.1 MG/DL (ref 8.6–10.5)
CHLORIDE SERPL-SCNC: 103 MMOL/L (ref 98–107)
CO2 SERPL-SCNC: 27 MMOL/L (ref 22–29)
CREAT SERPL-MCNC: 0.87 MG/DL (ref 0.76–1.27)
DEPRECATED RDW RBC AUTO: 46 FL (ref 37–54)
EGFRCR SERPLBLD CKD-EPI 2021: 101.9 ML/MIN/1.73
EOSINOPHIL # BLD AUTO: 0.16 10*3/MM3 (ref 0–0.4)
EOSINOPHIL NFR BLD AUTO: 2.5 % (ref 0.3–6.2)
ERYTHROCYTE [DISTWIDTH] IN BLOOD BY AUTOMATED COUNT: 12.5 % (ref 12.3–15.4)
GEN 5 2HR TROPONIN T REFLEX: 8 NG/L
GLOBULIN UR ELPH-MCNC: 1.9 GM/DL
GLUCOSE SERPL-MCNC: 99 MG/DL (ref 65–99)
HCT VFR BLD AUTO: 45.4 % (ref 37.5–51)
HGB BLD-MCNC: 15.1 G/DL (ref 13–17.7)
HOLD SPECIMEN: NORMAL
IMM GRANULOCYTES # BLD AUTO: 0.02 10*3/MM3 (ref 0–0.05)
IMM GRANULOCYTES NFR BLD AUTO: 0.3 % (ref 0–0.5)
LIPASE SERPL-CCNC: 19 U/L (ref 13–60)
LV EF NUC BP: 56 %
LYMPHOCYTES # BLD AUTO: 3 10*3/MM3 (ref 0.7–3.1)
LYMPHOCYTES NFR BLD AUTO: 46.4 % (ref 19.6–45.3)
MAXIMAL PREDICTED HEART RATE: 165 BPM
MCH RBC QN AUTO: 33.1 PG (ref 26.6–33)
MCHC RBC AUTO-ENTMCNC: 33.3 G/DL (ref 31.5–35.7)
MCV RBC AUTO: 99.6 FL (ref 79–97)
MONOCYTES # BLD AUTO: 0.4 10*3/MM3 (ref 0.1–0.9)
MONOCYTES NFR BLD AUTO: 6.2 % (ref 5–12)
NEUTROPHILS NFR BLD AUTO: 2.85 10*3/MM3 (ref 1.7–7)
NEUTROPHILS NFR BLD AUTO: 44.1 % (ref 42.7–76)
NRBC BLD AUTO-RTO: 0 /100 WBC (ref 0–0.2)
NT-PROBNP SERPL-MCNC: 355.8 PG/ML (ref 0–900)
PERCENT MAX PREDICTED HR: 66.67 %
PLATELET # BLD AUTO: 147 10*3/MM3 (ref 140–450)
PMV BLD AUTO: 9.7 FL (ref 6–12)
POTASSIUM SERPL-SCNC: 5.3 MMOL/L (ref 3.5–5.2)
PROT SERPL-MCNC: 6.6 G/DL (ref 6–8.5)
QT INTERVAL: 346 MS
QT INTERVAL: 346 MS
QTC INTERVAL: 418 MS
QTC INTERVAL: 418 MS
RBC # BLD AUTO: 4.56 10*6/MM3 (ref 4.14–5.8)
SODIUM SERPL-SCNC: 140 MMOL/L (ref 136–145)
STRESS BASELINE BP: NORMAL MMHG
STRESS BASELINE HR: 85 BPM
STRESS O2 SAT REST: 100 %
STRESS PERCENT HR: 78 %
STRESS POST ESTIMATED WORKLOAD: 1 METS
STRESS POST EXERCISE DUR MIN: 4 MIN
STRESS POST EXERCISE DUR SEC: 0 SEC
STRESS POST O2 SAT PEAK: 100 %
STRESS POST PEAK BP: NORMAL MMHG
STRESS POST PEAK HR: 110 BPM
STRESS TARGET HR: 140 BPM
TROPONIN T DELTA: -1 NG/L
TROPONIN T SERPL HS-MCNC: 9 NG/L
WBC NRBC COR # BLD AUTO: 6.46 10*3/MM3 (ref 3.4–10.8)
WHOLE BLOOD HOLD COAG: NORMAL
WHOLE BLOOD HOLD SPECIMEN: NORMAL

## 2024-09-17 PROCEDURE — 25010000002 REGADENOSON 0.4 MG/5ML SOLUTION: Performed by: NURSE PRACTITIONER

## 2024-09-17 PROCEDURE — 71045 X-RAY EXAM CHEST 1 VIEW: CPT

## 2024-09-17 PROCEDURE — 78452 HT MUSCLE IMAGE SPECT MULT: CPT

## 2024-09-17 PROCEDURE — 83880 ASSAY OF NATRIURETIC PEPTIDE: CPT | Performed by: EMERGENCY MEDICINE

## 2024-09-17 PROCEDURE — 85025 COMPLETE CBC W/AUTO DIFF WBC: CPT | Performed by: EMERGENCY MEDICINE

## 2024-09-17 PROCEDURE — 78452 HT MUSCLE IMAGE SPECT MULT: CPT | Performed by: INTERNAL MEDICINE

## 2024-09-17 PROCEDURE — 93005 ELECTROCARDIOGRAM TRACING: CPT | Performed by: EMERGENCY MEDICINE

## 2024-09-17 PROCEDURE — 93005 ELECTROCARDIOGRAM TRACING: CPT | Performed by: NURSE PRACTITIONER

## 2024-09-17 PROCEDURE — A9500 TC99M SESTAMIBI: HCPCS | Performed by: NURSE PRACTITIONER

## 2024-09-17 PROCEDURE — 93018 CV STRESS TEST I&R ONLY: CPT | Performed by: INTERNAL MEDICINE

## 2024-09-17 PROCEDURE — 84484 ASSAY OF TROPONIN QUANT: CPT | Performed by: EMERGENCY MEDICINE

## 2024-09-17 PROCEDURE — 93017 CV STRESS TEST TRACING ONLY: CPT

## 2024-09-17 PROCEDURE — 80053 COMPREHEN METABOLIC PANEL: CPT | Performed by: EMERGENCY MEDICINE

## 2024-09-17 PROCEDURE — 0 TECHNETIUM SESTAMIBI: Performed by: NURSE PRACTITIONER

## 2024-09-17 PROCEDURE — 36415 COLL VENOUS BLD VENIPUNCTURE: CPT

## 2024-09-17 PROCEDURE — 83690 ASSAY OF LIPASE: CPT | Performed by: EMERGENCY MEDICINE

## 2024-09-17 PROCEDURE — 93005 ELECTROCARDIOGRAM TRACING: CPT

## 2024-09-17 PROCEDURE — 99284 EMERGENCY DEPT VISIT MOD MDM: CPT

## 2024-09-17 RX ORDER — ASPIRIN 81 MG/1
324 TABLET, CHEWABLE ORAL ONCE
Status: COMPLETED | OUTPATIENT
Start: 2024-09-17 | End: 2024-09-17

## 2024-09-17 RX ORDER — SODIUM CHLORIDE 0.9 % (FLUSH) 0.9 %
10 SYRINGE (ML) INJECTION AS NEEDED
Status: DISCONTINUED | OUTPATIENT
Start: 2024-09-17 | End: 2024-09-17 | Stop reason: HOSPADM

## 2024-09-17 RX ORDER — REGADENOSON 0.08 MG/ML
0.4 INJECTION, SOLUTION INTRAVENOUS ONCE
Status: COMPLETED | OUTPATIENT
Start: 2024-09-17 | End: 2024-09-17

## 2024-09-17 RX ADMIN — ASPIRIN 81 MG 324 MG: 81 TABLET ORAL at 12:25

## 2024-09-17 RX ADMIN — TECHNETIUM TC 99M SESTAMIBI 1 DOSE: 1 INJECTION INTRAVENOUS at 08:35

## 2024-09-17 RX ADMIN — TECHNETIUM TC 99M SESTAMIBI 1 DOSE: 1 INJECTION INTRAVENOUS at 10:05

## 2024-09-17 RX ADMIN — REGADENOSON 0.4 MG: 0.08 INJECTION, SOLUTION INTRAVENOUS at 10:04

## 2024-09-19 ENCOUNTER — TELEPHONE (OUTPATIENT)
Dept: CARDIOLOGY | Facility: HOSPITAL | Age: 54
End: 2024-09-19
Payer: COMMERCIAL

## 2024-09-21 LAB
QT INTERVAL: 278 MS
QTC INTERVAL: 386 MS

## 2024-09-23 LAB
QT INTERVAL: 350 MS
QTC INTERVAL: 430 MS

## 2024-10-02 NOTE — PROGRESS NOTES
Cardiac Electrophysiology Outpatient Note  [facility]    Office Visit     Sheng Dominguez  [id number]  10/08/2024    Primary Care Physician: Tracey Adam APRN    Referred By: Michele Lance APRN    Subjective     Chief Complaint   Patient presents with    New onset a-fib     Problem List:  Atrial fibrillation  First diagnosed 8/26/2024 during colonoscopy  Echo 8/2024 reportedly with normal EF and no significant VHD-data deficit  Stress 9/17/2024 normal perfusion with Lexiscan Cardiolite, LVEF 56%, A-fib on baseline EKG, diffuse multivessel CAC noted  48-hour monitor pending  Seizure disorder  Hypertension  Dyslipidemia  Memory impairment  Anxiety        History of Present Illness:   Sheng Dominguez is a 54 y.o. male who presents to my electrophysiology clinic as a referral from Michele PASCUAL for recently diagnosed atrial fibrillation for consideration of catheter ablation.      As above patient was recently diagnosed with atrial fibrillation in the setting of a colonoscopy.  He does think he has some palpitations.  He also has noticed increased fatigue and sleepiness.  Also has noticed some chest pain.  He describes it as a squeezing sensation for a few seconds.    Past Medical History:   Diagnosis Date    Anxiety     Atrial fibrillation     Hyperlipidemia     Hypertension     Muscle pain        Past Surgical History:   Procedure Laterality Date    HAND SURGERY      SMALL INTESTINE SURGERY      TONSILLECTOMY         Family History   Problem Relation Age of Onset    Hypertension Mother     Cancer Father     Heart disease Sister     Heart disease Brother     Heart disease Maternal Grandmother     Heart disease Maternal Grandfather     Cancer Paternal Grandmother     Cancer Paternal Grandfather     Heart disease Maternal Aunt        Social History     Socioeconomic History    Marital status:    Tobacco Use    Smoking status: Never     Passive exposure:  Never    Smokeless tobacco: Never   Vaping Use    Vaping status: Never Used    Passive vaping exposure: Yes   Substance and Sexual Activity    Alcohol use: Not Currently    Drug use: Never    Sexual activity: Defer         Current Outpatient Medications:     ALPRAZolam (XANAX) 1 MG tablet, Take 1 tablet by mouth 3 (Three) Times a Day., Disp: , Rfl:     apixaban (ELIQUIS) 5 MG tablet tablet, Take 1 tablet by mouth Every 12 (Twelve) Hours., Disp: 60 tablet, Rfl: 6    aspirin 81 MG EC tablet, Take 1 tablet by mouth Daily., Disp: 90 tablet, Rfl: 3    atorvastatin (LIPITOR) 20 MG tablet, Take 1 tablet by mouth Every Night., Disp: 90 tablet, Rfl: 3    cetirizine (zyrTEC) 5 MG tablet, Take 1 tablet by mouth Daily., Disp: , Rfl:     coenzyme Q10 50 MG capsule capsule, Take 1 capsule by mouth Daily., Disp: , Rfl:     divalproex (DEPAKOTE) 500 MG DR tablet, Take 2 tablets by mouth., Disp: , Rfl:     gabapentin (NEURONTIN) 600 MG tablet, Take 1 tablet by mouth 3 (Three) Times a Day., Disp: , Rfl:     hyoscyamine (ANASPAZ,LEVSIN) 0.125 MG tablet, , Disp: , Rfl:     levETIRAcetam (KEPPRA) 1000 MG tablet, Take 1 tablet by mouth., Disp: , Rfl:     Magnesium Oxide -Mg Supplement 400 (240 Mg) MG tablet, Take 1 tablet by mouth Daily., Disp: , Rfl:     metoprolol tartrate (LOPRESSOR) 25 MG tablet, Take 1 tablet by mouth., Disp: , Rfl:     Omega-3 1000 MG capsule, Take  by mouth Daily., Disp: , Rfl:     pantoprazole (PROTONIX) 40 MG EC tablet, Take 1 tablet by mouth Daily., Disp: , Rfl:     sucralfate (CARAFATE) 1 g tablet, Take 1 tablet by mouth 4 (Four) Times a Day., Disp: , Rfl:     Testosterone 1.62 % gel, , Disp: , Rfl:     traZODone (DESYREL) 300 MG tablet, Take 0.5 tablets by mouth Daily., Disp: , Rfl:     vitamin C (ASCORBIC ACID) 250 MG tablet, Take 4 tablets by mouth Daily., Disp: , Rfl:     lisinopril (PRINIVIL,ZESTRIL) 10 MG tablet, Take 1 tablet by mouth Daily. (Patient not taking: Reported on 10/8/2024), Disp: , Rfl:  "    Allergies:   No Known Allergies    Objective   Vital Signs: Blood pressure 130/86, pulse 93, height 182.9 cm (72\"), weight 109 kg (240 lb), SpO2 95%.    PHYSICAL EXAM  General appearance: Awake, alert, cooperative  Head: Normocephalic, without obvious abnormality, atraumatic  Neck: No JVD  Lungs: Clear to ascultation bilaterally  Heart: Regular rate and rhythm, no murmurs, 2+ LE pulses, no lower extremity swelling  Skin: Skin color, turgor normal, no rashes or lesions  Neurologic: Grossly normal     Lab Results   Component Value Date    GLUCOSE 99 09/17/2024    CALCIUM 9.1 09/17/2024     09/17/2024    K 5.3 (H) 09/17/2024    CO2 27.0 09/17/2024     09/17/2024    BUN 16 09/17/2024    CREATININE 0.87 09/17/2024    BCR 18.4 09/17/2024    ANIONGAP 10.0 09/17/2024     Lab Results   Component Value Date    WBC 6.46 09/17/2024    HGB 15.1 09/17/2024    HCT 45.4 09/17/2024    MCV 99.6 (H) 09/17/2024     09/17/2024     No results found for: \"INR\", \"PROTIME\"  No results found for: \"TSH\", \"M3HSDHC\", \"M6XHZPP\", \"THYROIDAB\"              I personally viewed and interpreted the patient's EKG/Telemetry/lab data    Procedures    Sheng TRINA Dominguez  reports that he has never smoked. He has never been exposed to tobacco smoke. He has never used smokeless tobacco.     Sleep Apnea Screening Questionnaire - ‘STOP BANG’       Question Yes No    Do you Snore Loudly (loud enough to be heard through closed doors or your bed-partner elbows you for snoring at night)?  X    2.  Do you often fell Tired, Fatigued, or Sleepy during the daytime (such as        falling asleep during driving or talking to someone)?  X    3.  Has anyone Observed you Stop Breathing or Choking/Gasping during your        sleep?   X   4.  Do you have or are being treated for High Blood Pressure?  X    5.  Body Mass Index more than 35 kg/m2       Height:                    Weight:       10/08/24  1344   Weight: 109 kg (240 lb)          BMI: Body mass " index is 32.55 kg/m².  X   6.  Age older than 50? X    7.  Neck size larger? (Measured around [place] apple)       For Male: Is your shirt collar 17 inches / 43 cm or larger?        For Female: Is your shirt collar 17 inches / 43 cm or larger?      8.  Gender =    [x] Male   [] Female                   For general population   BRAD - Low Risk : Yes to 0 - 2 questions  BRAD - Intermediate Risk : Yes to 3 - 4 questions  BRAD - High Risk : Yes to 5 - 8 questions  or Yes to 2 or more of 4 STOP questions + male gender  or Yes to 2 or more of 4 STOP questions + BMI > 35kg/m2  or Yes to 2 or more of 4 STOP questions + neck circumference 17 inches / 43cm in male or 16 inches / 41cm in female     Modified from  Jayce BELLO et al. Anesthesiology 2008; 108: 812-821,   Jayce BELLO et al Br J Anaesth 2012; 108: 768-775,   Jayce BELLO et al J Clin Sleep Med Sept 2014.       Assessment & Plan    1. Persistent atrial fibrillation  Patient is referred for new diagnosis of persistent atrial fibrillation.  This was found incidentally at the time of a colonoscopy.  As far as we can tell he has remained in atrial fibrillation since then.  We discussed the pathophysiology of atrial fibrillation as well as treatment options going forward.  I do believe he has symptoms of atrial fibrillation.  We discussed the options of antiarrhythmic medications versus a catheter ablation.      For the latter, we discussed how the procedure was performed, the expected outcomes, and the potential risks (including vascular access complications, stroke, myocardial infarction, cardiac perforation, cardiac conduction abnormalities, valve dysfunction, or even death).    He is interested in proceeding with an ablation.  We will work on scheduling.    He is high risk for sleep apnea.  We are going to set him up with sleep medicine visit for this.        Follow Up:  Return for f/u after procedure.      Thank you for allowing me to participate in the care of your patient. Please  do not hesitate to contact me with additional questions or concerns.      Thomas George M.D.  Cardiac Electrophysiologist  [facility] / [facility]

## 2024-10-08 ENCOUNTER — OFFICE VISIT (OUTPATIENT)
Dept: CARDIOLOGY | Facility: CLINIC | Age: 54
End: 2024-10-08
Payer: COMMERCIAL

## 2024-10-08 VITALS
SYSTOLIC BLOOD PRESSURE: 130 MMHG | BODY MASS INDEX: 32.51 KG/M2 | HEART RATE: 93 BPM | WEIGHT: 240 LBS | DIASTOLIC BLOOD PRESSURE: 86 MMHG | HEIGHT: 72 IN | OXYGEN SATURATION: 95 %

## 2024-10-08 DIAGNOSIS — I48.19 PERSISTENT ATRIAL FIBRILLATION: Primary | ICD-10-CM

## 2024-10-08 DIAGNOSIS — R06.83 SNORING: ICD-10-CM

## 2024-10-08 DIAGNOSIS — I48.91 NEW ONSET A-FIB: Primary | ICD-10-CM

## 2024-10-30 ENCOUNTER — TELEPHONE (OUTPATIENT)
Dept: CARDIOLOGY | Facility: HOSPITAL | Age: 54
End: 2024-10-30
Payer: COMMERCIAL

## 2024-10-30 DIAGNOSIS — E87.5 HYPERKALEMIA: Primary | ICD-10-CM

## 2024-10-30 NOTE — TELEPHONE ENCOUNTER
Overdue Results    Order Name Placed Expected Extend Order Cancel Order Order Details   BASIC METABOLIC PANEL 8/28/24 9/11/24  Extend  Cancel  Order Details     Basic Metabolic Panel [LAB15] (Order [id number])  Order  Date: 8/28/2024 Department: [facility] CARDIOLOGY Ordering/Authorizing: Michele Lance APRN        Patient completed labs outside lab order. See below.   Lab Results   Component Value Date    GLUCOSE 99 09/17/2024    BUN 16 09/17/2024    CREATININE 0.87 09/17/2024     09/17/2024    K 5.3 (H) 09/17/2024     09/17/2024    CALCIUM 9.1 09/17/2024    PROTEINTOT 6.6 09/17/2024    ALBUMIN 4.7 09/17/2024    ALT 21 09/17/2024    AST 12 09/17/2024    ALKPHOS 58 09/17/2024    BILITOT 0.4 09/17/2024    GLOB 1.9 09/17/2024    AGRATIO 2.5 09/17/2024    BCR 18.4 09/17/2024    ANIONGAP 10.0 09/17/2024    EGFR 101.9 09/17/2024

## 2024-10-31 NOTE — NURSING NOTE
PRE-PVA ASSESSMENT  Sheng Dominguez 1970   [address] [facility] [address]   [phone]        Referral Source: Michele Lance APRN   Information obtained from: [x] Medical record review  [x] Patient report  Scheduled for: PVA w/ PFA on 12/02/2024 with Dr. George  No Known Allergies    AFib Specific History:    AFIBTYPE: persistent    CHADS-VASc Risk Assessment               1 Total Score    1 Hypertension    Criteria that do not apply:    CHF    Age >/= 75    DM    PRIOR STROKE/TIA/THROMBO    Vascular Disease    Age 65-74    Sex: Female            Anticoagulation: Eliquis 5 mg BID,  NO missed doses.   Cardioversion x 0  Failed AAD(s): 0  Prior Ablation: 0    Is Mr. Dominguez aware of his AFib? Yes   Onset: 2024    Exacerbations: none    Frequency: persistent   Alleviations: none     Duration: persistent     Symptoms:   [x] Palpitations:    [x] Chest Discomfort:    [] Dizziness:    [] Presyncope:    [] Lightheadedness:   [] Syncope:    [x] Fatigue:    [] Other:     [x] Short of Breath:     Last Echo(s):  [x] TTE Date:      8/2024 reportedly with normal EF   and no significant VHD-data deficit     LVEF 56%, on recent stress test.       Past medical History:     [] Diabetes  NO               [] HYPOthyroidism NO  [] HYPERthyroidism NO         TSH: 2.100    [x] HTN        [x] Tx: lisinopril            [] Heart Failure NO   [] CVA  NO                             [] TIA  NO        [] CAD  NO       [] MI NO             [x] Dyslipidemia  [x] Statin indicated: Atorvastatin 20 mg     [x] Ischemic Evaluation       [x] Stress Test: 9/17/2024 normal perfusion with Lexiscan Cardiolite, LVEF 56%, A-fib on baseline EKG, diffuse multivessel CAC noted        [] Heart Cath: NO      [x] Sleep Apnea Suspected        [x] Discussed with Mr. Dominguez         [x] Referral to [facility] - pending            [x] Obesity       [x] BMI 30-35        [x] Weight reduction discussed with Mr. Dominguez         [] Nutrition Consult            [x]  Declined by Mr. Dominguez       Summary of Patient Contact:    I spoke with Mr. Dominguez about his upcoming PVA.   He was well informed about the procedure from prior discussion with Dr. George and from reading the provided literature.  We discussed the procedure at length including risks, anesthesia, intra-op procedures, recovery, bedrest, normal post-procedure expectations, and success rates.  I answered a few remaining questions. Mr. Dominguez verbalized understanding and he is ready to proceed.

## 2024-10-31 NOTE — TELEPHONE ENCOUNTER
I have place an order to have patient repeat BMP to recheck his potassium level with in the next 2 weeks.

## 2024-11-26 ENCOUNTER — PRE-ADMISSION TESTING (OUTPATIENT)
Dept: PREADMISSION TESTING | Facility: HOSPITAL | Age: 54
End: 2024-11-26
Payer: COMMERCIAL

## 2024-11-26 ENCOUNTER — HOSPITAL ENCOUNTER (OUTPATIENT)
Dept: CT IMAGING | Facility: HOSPITAL | Age: 54
Discharge: HOME | End: 2024-11-26
Payer: COMMERCIAL

## 2024-11-26 DIAGNOSIS — I48.91 NEW ONSET A-FIB: ICD-10-CM

## 2024-11-26 LAB
ANION GAP SERPL CALCULATED.3IONS-SCNC: 5 MMOL/L (ref 5–15)
BUN SERPL-MCNC: 17 MG/DL (ref 6–20)
BUN/CREAT SERPL: 19.1 (ref 7–25)
CALCIUM SPEC-SCNC: 9.1 MG/DL (ref 8.6–10.5)
CHLORIDE SERPL-SCNC: 107 MMOL/L (ref 98–107)
CO2 SERPL-SCNC: 28 MMOL/L (ref 22–29)
CREAT SERPL-MCNC: 0.89 MG/DL (ref 0.76–1.27)
DEPRECATED RDW RBC AUTO: 48.5 FL (ref 37–54)
EGFRCR SERPLBLD CKD-EPI 2021: 101.2 ML/MIN/1.73
ERYTHROCYTE [DISTWIDTH] IN BLOOD BY AUTOMATED COUNT: 13.5 % (ref 12.3–15.4)
GLUCOSE SERPL-MCNC: 97 MG/DL (ref 65–99)
HCT VFR BLD AUTO: 40.4 % (ref 37.5–51)
HGB BLD-MCNC: 13.4 G/DL (ref 13–17.7)
MCH RBC QN AUTO: 32.3 PG (ref 26.6–33)
MCHC RBC AUTO-ENTMCNC: 33.2 G/DL (ref 31.5–35.7)
MCV RBC AUTO: 97.3 FL (ref 79–97)
PLATELET # BLD AUTO: 163 10*3/MM3 (ref 140–450)
PMV BLD AUTO: 9.7 FL (ref 6–12)
POTASSIUM SERPL-SCNC: 5.7 MMOL/L (ref 3.5–5.2)
RBC # BLD AUTO: 4.15 10*6/MM3 (ref 4.14–5.8)
SODIUM SERPL-SCNC: 140 MMOL/L (ref 136–145)
WBC NRBC COR # BLD AUTO: 5.09 10*3/MM3 (ref 3.4–10.8)

## 2024-11-26 PROCEDURE — 85027 COMPLETE CBC AUTOMATED: CPT

## 2024-11-26 PROCEDURE — 36415 COLL VENOUS BLD VENIPUNCTURE: CPT

## 2024-11-26 PROCEDURE — 71275 CT ANGIOGRAPHY CHEST: CPT

## 2024-11-26 PROCEDURE — 25510000001 IOPAMIDOL PER 1 ML: Performed by: STUDENT IN AN ORGANIZED HEALTH CARE EDUCATION/TRAINING PROGRAM

## 2024-11-26 PROCEDURE — 80048 BASIC METABOLIC PNL TOTAL CA: CPT

## 2024-11-26 RX ORDER — IOPAMIDOL 755 MG/ML
100 INJECTION, SOLUTION INTRAVASCULAR
Status: COMPLETED | OUTPATIENT
Start: 2024-11-26 | End: 2024-11-26

## 2024-11-26 RX ADMIN — IOPAMIDOL 80 ML: 755 INJECTION, SOLUTION INTRAVENOUS at 12:13

## 2024-11-27 ENCOUNTER — PREP FOR SURGERY (OUTPATIENT)
Dept: OTHER | Facility: HOSPITAL | Age: 54
End: 2024-11-27
Payer: COMMERCIAL

## 2024-11-27 DIAGNOSIS — I48.91 ATRIAL FIBRILLATION, UNSPECIFIED TYPE: Primary | ICD-10-CM

## 2024-11-27 RX ORDER — ONDANSETRON 2 MG/ML
4 INJECTION INTRAMUSCULAR; INTRAVENOUS EVERY 6 HOURS PRN
Status: CANCELLED | OUTPATIENT
Start: 2024-11-27

## 2024-11-27 RX ORDER — SODIUM CHLORIDE 9 MG/ML
40 INJECTION, SOLUTION INTRAVENOUS AS NEEDED
Status: CANCELLED | OUTPATIENT
Start: 2024-11-27

## 2024-11-27 RX ORDER — NITROGLYCERIN 0.4 MG/1
0.4 TABLET SUBLINGUAL
Status: CANCELLED | OUTPATIENT
Start: 2024-11-27

## 2024-11-27 RX ORDER — ACETAMINOPHEN 325 MG/1
650 TABLET ORAL EVERY 4 HOURS PRN
Status: CANCELLED | OUTPATIENT
Start: 2024-11-27

## 2024-12-01 ENCOUNTER — ANESTHESIA EVENT (OUTPATIENT)
Dept: CARDIOLOGY | Facility: HOSPITAL | Age: 54
End: 2024-12-01
Payer: COMMERCIAL

## 2024-12-01 NOTE — ANESTHESIA PREPROCEDURE EVALUATION
Anesthesia Evaluation     Patient summary reviewed and Nursing notes reviewed   NPO Solid Status: > 8 hours  NPO Liquid Status: > 2 hours           Airway   Mallampati: II  TM distance: >3 FB  Neck ROM: full  No difficulty expected  Dental    (+) poor dentition        Pulmonary    (+) asthma (MDI PRN  none recently),  (-) shortness of breath, recent URI, sleep apnea, not a smoker, no home oxygen    ROS comment: Sats 95% RA  Cardiovascular     ECG reviewed    (+) hypertension, dysrhythmias Atrial Fib, hyperlipidemia    ROS comment: ECG A Fib TW abn   Lexiscan Cadiolite 2024 normal perfusion no CP ST changes EF 56%     1. Atrial fibrillation  a. First diagnosed 8/26/2024 during colonoscopy  b. Echo 8/2024 reportedly with normal EF and no significant VHD-data deficit  c. Stress 9/17/2024 normal perfusion with Lexiscan Cardiolite, LVEF 56%, A-fib on baseline EKG, diffuse multivessel CAC noted  d. 48-hour monitor pending    2. Seizure disorder        Neuro/Psych  (+) seizures (20 plus years - takes depakote BID took this am)  (-) CVA  GI/Hepatic/Renal/Endo    (+) GERD  (-) no renal disease, diabetes, no thyroid disorder    Musculoskeletal     Abdominal    Substance History      OB/GYN          Other            Phys Exam Other: Poor dentition several missing none overtly loose   Discssed risks tooth loss damage c procedure               Anesthesia Plan    ASA 3     general     (Teeth care  Repeat K is normal )  intravenous induction     Anesthetic plan, risks, benefits, and alternatives have been provided, discussed and informed consent has been obtained with: patient.    Plan discussed with CRNA.    CODE STATUS:

## 2024-12-02 ENCOUNTER — ANESTHESIA (OUTPATIENT)
Dept: CARDIOLOGY | Facility: HOSPITAL | Age: 54
End: 2024-12-02
Payer: COMMERCIAL

## 2024-12-02 ENCOUNTER — HOSPITAL ENCOUNTER (OUTPATIENT)
Facility: HOSPITAL | Age: 54
Setting detail: HOSPITAL OUTPATIENT SURGERY
Discharge: HOME | End: 2024-12-02
Attending: STUDENT IN AN ORGANIZED HEALTH CARE EDUCATION/TRAINING PROGRAM | Admitting: STUDENT IN AN ORGANIZED HEALTH CARE EDUCATION/TRAINING PROGRAM
Payer: COMMERCIAL

## 2024-12-02 VITALS
WEIGHT: 239 LBS | OXYGEN SATURATION: 97 % | DIASTOLIC BLOOD PRESSURE: 88 MMHG | BODY MASS INDEX: 32.37 KG/M2 | HEIGHT: 72 IN | HEART RATE: 75 BPM | SYSTOLIC BLOOD PRESSURE: 126 MMHG | RESPIRATION RATE: 12 BRPM | TEMPERATURE: 98.6 F

## 2024-12-02 DIAGNOSIS — I48.91 NEW ONSET A-FIB: ICD-10-CM

## 2024-12-02 LAB — POTASSIUM SERPL-SCNC: 4.9 MMOL/L (ref 3.5–5.2)

## 2024-12-02 PROCEDURE — C1732 CATH, EP, DIAG/ABL, 3D/VECT: HCPCS | Performed by: STUDENT IN AN ORGANIZED HEALTH CARE EDUCATION/TRAINING PROGRAM

## 2024-12-02 PROCEDURE — C1894 INTRO/SHEATH, NON-LASER: HCPCS | Performed by: STUDENT IN AN ORGANIZED HEALTH CARE EDUCATION/TRAINING PROGRAM

## 2024-12-02 PROCEDURE — 25010000002 DEXAMETHASONE PER 1 MG

## 2024-12-02 PROCEDURE — 25010000002 SUGAMMADEX 200 MG/2ML SOLUTION

## 2024-12-02 PROCEDURE — 25010000002 PROTAMINE SULFATE PER 10 MG: Performed by: STUDENT IN AN ORGANIZED HEALTH CARE EDUCATION/TRAINING PROGRAM

## 2024-12-02 PROCEDURE — 25010000002 PHENYLEPHRINE 10 MG/ML SOLUTION 1 ML VIAL

## 2024-12-02 PROCEDURE — 93655 ICAR CATH ABLTJ DSCRT ARRHYT: CPT | Performed by: STUDENT IN AN ORGANIZED HEALTH CARE EDUCATION/TRAINING PROGRAM

## 2024-12-02 PROCEDURE — C1760 CLOSURE DEV, VASC: HCPCS | Performed by: STUDENT IN AN ORGANIZED HEALTH CARE EDUCATION/TRAINING PROGRAM

## 2024-12-02 PROCEDURE — C1733 CATH, EP, OTHR THAN COOL-TIP: HCPCS | Performed by: STUDENT IN AN ORGANIZED HEALTH CARE EDUCATION/TRAINING PROGRAM

## 2024-12-02 PROCEDURE — 93656 COMPRE EP EVAL ABLTJ ATR FIB: CPT | Performed by: STUDENT IN AN ORGANIZED HEALTH CARE EDUCATION/TRAINING PROGRAM

## 2024-12-02 PROCEDURE — 36415 COLL VENOUS BLD VENIPUNCTURE: CPT

## 2024-12-02 PROCEDURE — 93657 TX L/R ATRIAL FIB ADDL: CPT | Performed by: STUDENT IN AN ORGANIZED HEALTH CARE EDUCATION/TRAINING PROGRAM

## 2024-12-02 PROCEDURE — C1769 GUIDE WIRE: HCPCS | Performed by: STUDENT IN AN ORGANIZED HEALTH CARE EDUCATION/TRAINING PROGRAM

## 2024-12-02 PROCEDURE — 25810000003 SODIUM CHLORIDE 0.9 % SOLUTION

## 2024-12-02 PROCEDURE — 25010000002 LIDOCAINE PF 1% 1 % SOLUTION

## 2024-12-02 PROCEDURE — 93623 PRGRMD STIMJ&PACG IV RX NFS: CPT | Performed by: STUDENT IN AN ORGANIZED HEALTH CARE EDUCATION/TRAINING PROGRAM

## 2024-12-02 PROCEDURE — C1730 CATH, EP, 19 OR FEW ELECT: HCPCS | Performed by: STUDENT IN AN ORGANIZED HEALTH CARE EDUCATION/TRAINING PROGRAM

## 2024-12-02 PROCEDURE — 25010000002 PROPOFOL 10 MG/ML EMULSION

## 2024-12-02 PROCEDURE — 25810000003 SODIUM CHLORIDE 0.9 % SOLUTION 250 ML FLEX CONT

## 2024-12-02 PROCEDURE — 25010000002 ATROPINE SULFATE

## 2024-12-02 PROCEDURE — 25010000002 HEPARIN (PORCINE) PER 1000 UNITS: Performed by: STUDENT IN AN ORGANIZED HEALTH CARE EDUCATION/TRAINING PROGRAM

## 2024-12-02 PROCEDURE — 25010000002 ONDANSETRON PER 1 MG

## 2024-12-02 PROCEDURE — C1759 CATH, INTRA ECHOCARDIOGRAPHY: HCPCS | Performed by: STUDENT IN AN ORGANIZED HEALTH CARE EDUCATION/TRAINING PROGRAM

## 2024-12-02 PROCEDURE — C1766 INTRO/SHEATH,STRBLE,NON-PEEL: HCPCS | Performed by: STUDENT IN AN ORGANIZED HEALTH CARE EDUCATION/TRAINING PROGRAM

## 2024-12-02 PROCEDURE — 84132 ASSAY OF SERUM POTASSIUM: CPT | Performed by: ANESTHESIOLOGY

## 2024-12-02 PROCEDURE — C1893 INTRO/SHEATH, FIXED,NON-PEEL: HCPCS | Performed by: STUDENT IN AN ORGANIZED HEALTH CARE EDUCATION/TRAINING PROGRAM

## 2024-12-02 RX ORDER — ONDANSETRON 2 MG/ML
4 INJECTION INTRAMUSCULAR; INTRAVENOUS ONCE AS NEEDED
Status: DISCONTINUED | OUTPATIENT
Start: 2024-12-02 | End: 2024-12-02 | Stop reason: HOSPADM

## 2024-12-02 RX ORDER — SODIUM CHLORIDE 9 MG/ML
9 INJECTION, SOLUTION INTRAVENOUS AS NEEDED
Status: DISCONTINUED | OUTPATIENT
Start: 2024-12-02 | End: 2024-12-02 | Stop reason: HOSPADM

## 2024-12-02 RX ORDER — HEPARIN SODIUM 1000 [USP'U]/ML
INJECTION, SOLUTION INTRAVENOUS; SUBCUTANEOUS
Status: DISCONTINUED | OUTPATIENT
Start: 2024-12-02 | End: 2024-12-02 | Stop reason: HOSPADM

## 2024-12-02 RX ORDER — SODIUM CHLORIDE 9 MG/ML
40 INJECTION, SOLUTION INTRAVENOUS AS NEEDED
Status: DISCONTINUED | OUTPATIENT
Start: 2024-12-02 | End: 2024-12-02 | Stop reason: HOSPADM

## 2024-12-02 RX ORDER — SODIUM CHLORIDE 0.9 % (FLUSH) 0.9 %
3 SYRINGE (ML) INJECTION EVERY 12 HOURS SCHEDULED
Status: DISCONTINUED | OUTPATIENT
Start: 2024-12-02 | End: 2024-12-02 | Stop reason: HOSPADM

## 2024-12-02 RX ORDER — FENTANYL CITRATE 50 UG/ML
50 INJECTION, SOLUTION INTRAMUSCULAR; INTRAVENOUS
Status: DISCONTINUED | OUTPATIENT
Start: 2024-12-02 | End: 2024-12-02 | Stop reason: HOSPADM

## 2024-12-02 RX ORDER — FAMOTIDINE 10 MG/ML
20 INJECTION, SOLUTION INTRAVENOUS ONCE
Status: DISCONTINUED | OUTPATIENT
Start: 2024-12-02 | End: 2024-12-02 | Stop reason: HOSPADM

## 2024-12-02 RX ORDER — LIDOCAINE HYDROCHLORIDE 10 MG/ML
0.5 INJECTION, SOLUTION EPIDURAL; INFILTRATION; INTRACAUDAL; PERINEURAL ONCE AS NEEDED
Status: DISCONTINUED | OUTPATIENT
Start: 2024-12-02 | End: 2024-12-02 | Stop reason: HOSPADM

## 2024-12-02 RX ORDER — PROTAMINE SULFATE 10 MG/ML
INJECTION, SOLUTION INTRAVENOUS
Status: DISCONTINUED | OUTPATIENT
Start: 2024-12-02 | End: 2024-12-02 | Stop reason: HOSPADM

## 2024-12-02 RX ORDER — PROMETHAZINE HYDROCHLORIDE 25 MG/1
25 TABLET ORAL ONCE AS NEEDED
Status: DISCONTINUED | OUTPATIENT
Start: 2024-12-02 | End: 2024-12-02 | Stop reason: HOSPADM

## 2024-12-02 RX ORDER — SODIUM CHLORIDE 0.9 % (FLUSH) 0.9 %
10 SYRINGE (ML) INJECTION AS NEEDED
Status: DISCONTINUED | OUTPATIENT
Start: 2024-12-02 | End: 2024-12-02 | Stop reason: HOSPADM

## 2024-12-02 RX ORDER — SODIUM CHLORIDE 9 MG/ML
INJECTION, SOLUTION INTRAVENOUS CONTINUOUS PRN
Status: DISCONTINUED | OUTPATIENT
Start: 2024-12-02 | End: 2024-12-02 | Stop reason: SURG

## 2024-12-02 RX ORDER — PROMETHAZINE HYDROCHLORIDE 25 MG/1
25 SUPPOSITORY RECTAL ONCE AS NEEDED
Status: DISCONTINUED | OUTPATIENT
Start: 2024-12-02 | End: 2024-12-02 | Stop reason: HOSPADM

## 2024-12-02 RX ORDER — FAMOTIDINE 20 MG/1
20 TABLET, FILM COATED ORAL ONCE
Status: COMPLETED | OUTPATIENT
Start: 2024-12-02 | End: 2024-12-02

## 2024-12-02 RX ORDER — ONDANSETRON 2 MG/ML
INJECTION INTRAMUSCULAR; INTRAVENOUS AS NEEDED
Status: DISCONTINUED | OUTPATIENT
Start: 2024-12-02 | End: 2024-12-02 | Stop reason: SURG

## 2024-12-02 RX ORDER — LABETALOL HYDROCHLORIDE 5 MG/ML
5 INJECTION, SOLUTION INTRAVENOUS
Status: DISCONTINUED | OUTPATIENT
Start: 2024-12-02 | End: 2024-12-02 | Stop reason: HOSPADM

## 2024-12-02 RX ORDER — ONDANSETRON 2 MG/ML
4 INJECTION INTRAMUSCULAR; INTRAVENOUS EVERY 6 HOURS PRN
Status: DISCONTINUED | OUTPATIENT
Start: 2024-12-02 | End: 2024-12-02 | Stop reason: HOSPADM

## 2024-12-02 RX ORDER — HEPARIN SODIUM 10000 [USP'U]/100ML
INJECTION, SOLUTION INTRAVENOUS
Status: DISCONTINUED | OUTPATIENT
Start: 2024-12-02 | End: 2024-12-02 | Stop reason: HOSPADM

## 2024-12-02 RX ORDER — ROCURONIUM BROMIDE 10 MG/ML
INJECTION, SOLUTION INTRAVENOUS AS NEEDED
Status: DISCONTINUED | OUTPATIENT
Start: 2024-12-02 | End: 2024-12-02 | Stop reason: SURG

## 2024-12-02 RX ORDER — MIDAZOLAM HYDROCHLORIDE 1 MG/ML
1 INJECTION, SOLUTION INTRAMUSCULAR; INTRAVENOUS
Status: DISCONTINUED | OUTPATIENT
Start: 2024-12-02 | End: 2024-12-02 | Stop reason: HOSPADM

## 2024-12-02 RX ORDER — NITROGLYCERIN 0.4 MG/1
0.4 TABLET SUBLINGUAL
Status: DISCONTINUED | OUTPATIENT
Start: 2024-12-02 | End: 2024-12-02 | Stop reason: HOSPADM

## 2024-12-02 RX ORDER — BUPIVACAINE HCL/0.9 % NACL/PF 0.125 %
PLASTIC BAG, INJECTION (ML) EPIDURAL AS NEEDED
Status: DISCONTINUED | OUTPATIENT
Start: 2024-12-02 | End: 2024-12-02 | Stop reason: SURG

## 2024-12-02 RX ORDER — HYDROMORPHONE HYDROCHLORIDE 1 MG/ML
0.5 INJECTION, SOLUTION INTRAMUSCULAR; INTRAVENOUS; SUBCUTANEOUS
Status: DISCONTINUED | OUTPATIENT
Start: 2024-12-02 | End: 2024-12-02 | Stop reason: HOSPADM

## 2024-12-02 RX ORDER — DROPERIDOL 2.5 MG/ML
0.62 INJECTION, SOLUTION INTRAMUSCULAR; INTRAVENOUS
Status: DISCONTINUED | OUTPATIENT
Start: 2024-12-02 | End: 2024-12-02 | Stop reason: HOSPADM

## 2024-12-02 RX ORDER — SODIUM CHLORIDE, SODIUM LACTATE, POTASSIUM CHLORIDE, CALCIUM CHLORIDE 600; 310; 30; 20 MG/100ML; MG/100ML; MG/100ML; MG/100ML
9 INJECTION, SOLUTION INTRAVENOUS CONTINUOUS
Status: DISCONTINUED | OUTPATIENT
Start: 2024-12-03 | End: 2024-12-02 | Stop reason: HOSPADM

## 2024-12-02 RX ORDER — LIDOCAINE HYDROCHLORIDE 10 MG/ML
INJECTION, SOLUTION EPIDURAL; INFILTRATION; INTRACAUDAL; PERINEURAL AS NEEDED
Status: DISCONTINUED | OUTPATIENT
Start: 2024-12-02 | End: 2024-12-02 | Stop reason: SURG

## 2024-12-02 RX ORDER — DEXAMETHASONE SODIUM PHOSPHATE 4 MG/ML
INJECTION, SOLUTION INTRA-ARTICULAR; INTRALESIONAL; INTRAMUSCULAR; INTRAVENOUS; SOFT TISSUE AS NEEDED
Status: DISCONTINUED | OUTPATIENT
Start: 2024-12-02 | End: 2024-12-02 | Stop reason: SURG

## 2024-12-02 RX ORDER — SODIUM CHLORIDE, SODIUM LACTATE, POTASSIUM CHLORIDE, CALCIUM CHLORIDE 600; 310; 30; 20 MG/100ML; MG/100ML; MG/100ML; MG/100ML
9 INJECTION, SOLUTION INTRAVENOUS CONTINUOUS
Status: DISCONTINUED | OUTPATIENT
Start: 2024-12-02 | End: 2024-12-02 | Stop reason: HOSPADM

## 2024-12-02 RX ORDER — PROPOFOL 10 MG/ML
VIAL (ML) INTRAVENOUS AS NEEDED
Status: DISCONTINUED | OUTPATIENT
Start: 2024-12-02 | End: 2024-12-02 | Stop reason: SURG

## 2024-12-02 RX ORDER — NALOXONE HCL 0.4 MG/ML
0.4 VIAL (ML) INJECTION AS NEEDED
Status: DISCONTINUED | OUTPATIENT
Start: 2024-12-02 | End: 2024-12-02 | Stop reason: HOSPADM

## 2024-12-02 RX ORDER — SODIUM CHLORIDE 0.9 % (FLUSH) 0.9 %
3-10 SYRINGE (ML) INJECTION AS NEEDED
Status: DISCONTINUED | OUTPATIENT
Start: 2024-12-02 | End: 2024-12-02 | Stop reason: HOSPADM

## 2024-12-02 RX ORDER — ACETAMINOPHEN 650 MG/1
650 SUPPOSITORY RECTAL EVERY 4 HOURS PRN
Status: DISCONTINUED | OUTPATIENT
Start: 2024-12-02 | End: 2024-12-02 | Stop reason: HOSPADM

## 2024-12-02 RX ORDER — SODIUM CHLORIDE 0.9 % (FLUSH) 0.9 %
10 SYRINGE (ML) INJECTION EVERY 12 HOURS SCHEDULED
Status: DISCONTINUED | OUTPATIENT
Start: 2024-12-02 | End: 2024-12-02 | Stop reason: HOSPADM

## 2024-12-02 RX ORDER — MEPERIDINE HYDROCHLORIDE 25 MG/ML
12.5 INJECTION INTRAMUSCULAR; INTRAVENOUS; SUBCUTANEOUS
Status: DISCONTINUED | OUTPATIENT
Start: 2024-12-02 | End: 2024-12-02 | Stop reason: HOSPADM

## 2024-12-02 RX ORDER — DROPERIDOL 2.5 MG/ML
0.62 INJECTION, SOLUTION INTRAMUSCULAR; INTRAVENOUS ONCE AS NEEDED
Status: DISCONTINUED | OUTPATIENT
Start: 2024-12-02 | End: 2024-12-02 | Stop reason: HOSPADM

## 2024-12-02 RX ORDER — IPRATROPIUM BROMIDE AND ALBUTEROL SULFATE 2.5; .5 MG/3ML; MG/3ML
3 SOLUTION RESPIRATORY (INHALATION) ONCE AS NEEDED
Status: DISCONTINUED | OUTPATIENT
Start: 2024-12-02 | End: 2024-12-02 | Stop reason: HOSPADM

## 2024-12-02 RX ORDER — ACETAMINOPHEN 325 MG/1
650 TABLET ORAL EVERY 4 HOURS PRN
Status: DISCONTINUED | OUTPATIENT
Start: 2024-12-02 | End: 2024-12-02 | Stop reason: HOSPADM

## 2024-12-02 RX ORDER — HYDRALAZINE HYDROCHLORIDE 20 MG/ML
5 INJECTION INTRAMUSCULAR; INTRAVENOUS
Status: DISCONTINUED | OUTPATIENT
Start: 2024-12-02 | End: 2024-12-02 | Stop reason: HOSPADM

## 2024-12-02 RX ORDER — HYDROCODONE BITARTRATE AND ACETAMINOPHEN 5; 325 MG/1; MG/1
1 TABLET ORAL ONCE AS NEEDED
Status: DISCONTINUED | OUTPATIENT
Start: 2024-12-02 | End: 2024-12-02 | Stop reason: HOSPADM

## 2024-12-02 RX ADMIN — LIDOCAINE HYDROCHLORIDE 50 MG: 10 INJECTION, SOLUTION EPIDURAL; INFILTRATION; INTRACAUDAL; PERINEURAL at 07:36

## 2024-12-02 RX ADMIN — Medication 200 MCG: at 10:00

## 2024-12-02 RX ADMIN — PHENYLEPHRINE HYDROCHLORIDE 0.5 MCG/KG/MIN: 10 INJECTION INTRAVENOUS at 07:48

## 2024-12-02 RX ADMIN — ONDANSETRON 4 MG: 2 INJECTION INTRAMUSCULAR; INTRAVENOUS at 09:58

## 2024-12-02 RX ADMIN — ROCURONIUM BROMIDE 20 MG: 10 SOLUTION INTRAVENOUS at 08:21

## 2024-12-02 RX ADMIN — ATROPINE SULFATE 0.2 MG: 0.1 INJECTION INTRAVENOUS at 08:39

## 2024-12-02 RX ADMIN — DEXAMETHASONE SODIUM PHOSPHATE 4 MG: 4 INJECTION, SOLUTION INTRAMUSCULAR; INTRAVENOUS at 07:43

## 2024-12-02 RX ADMIN — Medication 100 MCG: at 10:04

## 2024-12-02 RX ADMIN — SUGAMMADEX 200 MG: 100 INJECTION, SOLUTION INTRAVENOUS at 10:01

## 2024-12-02 RX ADMIN — ROCURONIUM BROMIDE 30 MG: 10 SOLUTION INTRAVENOUS at 08:51

## 2024-12-02 RX ADMIN — PROPOFOL 200 MG: 10 INJECTION, EMULSION INTRAVENOUS at 07:36

## 2024-12-02 RX ADMIN — SODIUM CHLORIDE: 9 INJECTION, SOLUTION INTRAVENOUS at 07:33

## 2024-12-02 RX ADMIN — ROCURONIUM BROMIDE 50 MG: 10 SOLUTION INTRAVENOUS at 07:36

## 2024-12-02 RX ADMIN — Medication 200 MCG: at 07:46

## 2024-12-02 RX ADMIN — Medication 100 MCG: at 07:41

## 2024-12-02 RX ADMIN — Medication 200 MCG: at 09:30

## 2024-12-02 RX ADMIN — FAMOTIDINE 20 MG: 20 TABLET, FILM COATED ORAL at 07:02

## 2024-12-02 RX ADMIN — Medication 100 MCG: at 07:38

## 2024-12-02 RX ADMIN — ROCURONIUM BROMIDE 20 MG: 10 SOLUTION INTRAVENOUS at 09:38

## 2024-12-02 NOTE — Clinical Note
Replaced previous sheath in the right femoral vein. 8FR SHEATH EXCHANGED FOR FARADRIVE SHEATH WITH VERSACROSS INNER AND [employer] WIRE

## 2024-12-02 NOTE — Clinical Note
75 ablations performed for a total of 187.5 seconds.  Ketoconazole Pregnancy And Lactation Text: This medication is Pregnancy Category C and it isn't know if it is safe during pregnancy. It is also excreted in breast milk and breast feeding isn't recommended.

## 2024-12-02 NOTE — Clinical Note
Replaced previous sheath in the right femoral vein. FARADRIVE SHEATH EXCHANGED FOR SAME 11FR SHEATH OVER WIRE

## 2024-12-02 NOTE — H&P
Pre-cardiac Ablation History and Physical  [facility]      Patient:  Sheng Dominguez  :  1970  MRN: [id number]    PCP:  Tracey Adam APRN  PHONE:  [phone]    DATE: 2024  ID: Sheng Dominguez is a 54 y.o. male from [facility]    CC: Afib, palpitations    Problem List:  Atrial fibrillation  First diagnosed 2024 during colonoscopy  Echo 2024 reportedly with normal EF and no significant VHD-data deficit  Stress 2024 normal perfusion with Lexiscan Cardiolite, LVEF 56%, A-fib on baseline EKG, diffuse multivessel CAC noted  48-hour monitor pending  Seizure disorder  Hypertension  Dyslipidemia  Memory impairment  Anxiety      BRIEF HPI:   Mr. Dominguez is a 54-year-old male with the above medical history who presents for pulmonary vein ablation.  He was recently diagnosed with atrial fibrillation when presenting for colonoscopy.  He does admit to increased fatigue, sleepiness, an intermittent brief squeezing sensation in his chest and some palpitations.  He denies any significant health changes since being seen in the office in early October.        Allergies:      No Known Allergies    MEDICATIONS:  Current Outpatient Medications   Medication Instructions    ALPRAZolam (XANAX) 1 mg, 3 Times Daily    apixaban (ELIQUIS) 5 mg, Oral, Every 12 Hours Scheduled    aspirin 81 mg, Oral, Daily    atorvastatin (LIPITOR) 20 mg, Oral, Nightly    cetirizine (ZYRTEC) 5 mg, Daily PRN    coenzyme Q10 50 mg, Daily    divalproex (DEPAKOTE) 1,000 mg, 2 Times Daily    gabapentin (NEURONTIN) 600 mg, 3 Times Daily    hyoscyamine (ANASPAZ,LEVSIN) 0.125 MG tablet Take 1 tablet by mouth Every 6 (Six) Hours As Needed for Cramping or Diarrhea.    levETIRAcetam (KEPPRA) 1,000 mg, 2 Times Daily    Magnesium Oxide -Mg Supplement 400 mg, Daily    metoprolol tartrate (LOPRESSOR) 25 mg, 2 Times Daily    Omega-3 1000 MG capsule 1 capsule, Daily    pantoprazole (PROTONIX) 40 mg, Daily     "Testosterone 1.62 % gel Apply  topically Daily. ONCE DAILY    traZODone (DESYREL) 150 mg, Nightly    vitamin C (ASCORBIC ACID) 1,000 mg, Daily       Past medical & surgical history, social and family history reviewed in the electronic medical record.    ROS: Pertinent positives listed in the HPI and problem list above. All others reviewed and negative.     Physical Exam:   BP (!) 137/101 (BP Location: Left arm, Patient Position: Lying)   Pulse 84   Temp 96.5 °F (35.8 °C) (Temporal)   Resp 18   Ht 182.9 cm (72\")   Wt 108 kg (239 lb)   SpO2 94%   BMI 32.41 kg/m²     Constitutional:    Well-appearing 54 y.o. y/o adult  in no acute distress        Heart:    Irreg irreg rhythm and normal rate, no murmurs, rubs or gallops   Lungs:     Some mild expiratory wheezing throughout otherwise clear, nonlabored respirations       Extremities:   No gross deformities, no edema, clubbing, or cyanosis.    Pulses:    Neuro:  Psych:   Radial and dorsalis pedis pulses palpable and equal bilaterally.  No gross focal deficits  Mood and behavior appropriate for situation       Labs and Diagnostic Data:  Results from last 7 days   Lab Units 11/26/24  1135   SODIUM mmol/L 140   POTASSIUM mmol/L 5.7*   CHLORIDE mmol/L 107   CO2 mmol/L 28.0   BUN mg/dL 17   CREATININE mg/dL 0.89   GLUCOSE mg/dL 97   CALCIUM mg/dL 9.1     Results from last 7 days   Lab Units 11/26/24  1135   WBC 10*3/mm3 5.09   HEMOGLOBIN g/dL 13.4   HEMATOCRIT % 40.4   PLATELETS 10*3/mm3 163     Lab Results   Component Value Date    AST 12 09/17/2024    ALT 21 09/17/2024                     Tele: afib    IMPRESSION:  Mr. Dominguez is a 54-year-old male with recent diagnosis of paroxysmal atrial fibrillation who presents for pulmonary vein ablation with PFA.  BERNABE Cruz last night    PLAN:  Procedure to perform: PVA with PFA. Risks, benefits and alternatives to the procedure explained to the patient and he understands and wishes to proceed.     Electronically signed by Eduard" PHILLIP Dietz, 12/02/24, 7:08 AM EST.

## 2024-12-02 NOTE — ANESTHESIA PROCEDURE NOTES
Airway  Urgency: elective    Date/Time: 12/2/2024 7:40 AM  Airway not difficult    General Information and Staff    Patient location during procedure: OR  CRNA/CAA: Pao Delarosa CRNA    Indications and Patient Condition  Indications for airway management: airway protection    Preoxygenated: yes  MILS not maintained throughout  Mask difficulty assessment: 1 - vent by mask    Final Airway Details  Final airway type: endotracheal airway      Successful airway: ETT  Cuffed: yes   Successful intubation technique: video laryngoscopy  Facilitating devices/methods: intubating stylet  Endotracheal tube insertion site: oral  Blade: Craig  Blade size: 4  ETT size (mm): 8.0  Cormack-Lehane Classification: grade I - full view of glottis  Placement verified by: chest auscultation and capnometry   Measured from: lips  ETT/EBT  to lips (cm): 23  Number of attempts at approach: 1  Assessment: lips, teeth, and gum same as pre-op and atraumatic intubation    Additional Comments  Negative epigastric sounds, Breath sound equal bilaterally with symmetric chest rise and fall.

## 2024-12-02 NOTE — ANESTHESIA POSTPROCEDURE EVALUATION
Patient: Sheng Dominguez    Procedure Summary       Date: 12/02/24 Room / Location: [facility] / [facility]    Anesthesia Start: 0724 Anesthesia Stop: 1017    Procedure: Ablation atrial fibrillation with PFA with carto; CTA prior; hold eliquis morning of procedure; no other meds to hold Diagnosis:       New onset a-fib      (a fib)    Providers: Thomas George MD Provider: Porter Rodas MD    Anesthesia Type: general ASA Status: 3            Anesthesia Type: general    Vitals  No vitals data found for the desired time range.          Post Anesthesia Care and Evaluation    Patient location during evaluation: PACU  Patient participation: complete - patient participated  Level of consciousness: sleepy but conscious  Pain management: adequate    Airway patency: patent  Anesthetic complications: No anesthetic complications  PONV Status: none  Cardiovascular status: hemodynamically stable and acceptable  Respiratory status: nonlabored ventilation, acceptable and nasal cannula  Hydration status: acceptable    Comments: /61  HR 88  Sats 94  Temp 98.6

## 2024-12-02 NOTE — Clinical Note
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

## 2024-12-02 NOTE — Clinical Note
A 8 fr sheath was successfully inserted with ultrasound guidance into the left femoral vein. Sheath insertion not delayed.

## 2024-12-03 ENCOUNTER — CALL CENTER PROGRAMS (OUTPATIENT)
Dept: CALL CENTER | Facility: HOSPITAL | Age: 54
End: 2024-12-03
Payer: COMMERCIAL

## 2024-12-03 NOTE — OUTREACH NOTE
PCI/Device Survey      Flowsheet Row Responses   Facility patient discharged from? [facility]   Procedure date 12/02/24   Procedure (if device, specify in description) Ablation   Performing MD Dr. Thomas George   Attempt successful? No   Unsuccessful attempts Attempt 2  [Spouse/Pt attempted,  no answer]            Coral CHENEY - Registered Nurse

## 2024-12-03 NOTE — OUTREACH NOTE
PCI/Device Survey      Flowsheet Row Responses   Facility patient discharged from? [facility]   Procedure date 12/02/24   Procedure (if device, specify in description) Ablation   Performing MD Dr. Thomas George   Attempt successful? No   Unsuccessful attempts Attempt 1            [facility] - Registered Nurse

## 2024-12-04 ENCOUNTER — APPOINTMENT (OUTPATIENT)
Dept: GENERAL RADIOLOGY | Facility: HOSPITAL | Age: 54
End: 2024-12-04
Payer: COMMERCIAL

## 2024-12-04 ENCOUNTER — APPOINTMENT (OUTPATIENT)
Dept: CT IMAGING | Facility: HOSPITAL | Age: 54
End: 2024-12-04
Payer: COMMERCIAL

## 2024-12-04 ENCOUNTER — HOSPITAL ENCOUNTER (EMERGENCY)
Facility: HOSPITAL | Age: 54
Discharge: HOME | End: 2024-12-04
Attending: STUDENT IN AN ORGANIZED HEALTH CARE EDUCATION/TRAINING PROGRAM | Admitting: STUDENT IN AN ORGANIZED HEALTH CARE EDUCATION/TRAINING PROGRAM
Payer: COMMERCIAL

## 2024-12-04 VITALS
BODY MASS INDEX: 32.25 KG/M2 | TEMPERATURE: 97.3 F | RESPIRATION RATE: 19 BRPM | HEIGHT: 72 IN | WEIGHT: 238.1 LBS | SYSTOLIC BLOOD PRESSURE: 127 MMHG | HEART RATE: 76 BPM | OXYGEN SATURATION: 92 % | DIASTOLIC BLOOD PRESSURE: 82 MMHG

## 2024-12-04 DIAGNOSIS — J45.41 MODERATE PERSISTENT ASTHMA WITH ACUTE EXACERBATION: ICD-10-CM

## 2024-12-04 DIAGNOSIS — R06.02 SHORTNESS OF BREATH: Primary | ICD-10-CM

## 2024-12-04 LAB
ALBUMIN SERPL-MCNC: 4 G/DL (ref 3.5–5.2)
ALBUMIN/GLOB SERPL: 1.8 G/DL
ALP SERPL-CCNC: 54 U/L (ref 39–117)
ALT SERPL W P-5'-P-CCNC: 34 U/L (ref 1–41)
ANION GAP SERPL CALCULATED.3IONS-SCNC: 9.6 MMOL/L (ref 5–15)
AST SERPL-CCNC: 38 U/L (ref 1–40)
BASOPHILS # BLD AUTO: 0.03 10*3/MM3 (ref 0–0.2)
BASOPHILS NFR BLD AUTO: 0.3 % (ref 0–1.5)
BILIRUB SERPL-MCNC: 0.5 MG/DL (ref 0–1.2)
BUN SERPL-MCNC: 23 MG/DL (ref 6–20)
BUN/CREAT SERPL: 20.2 (ref 7–25)
CALCIUM SPEC-SCNC: 9.1 MG/DL (ref 8.6–10.5)
CHLORIDE SERPL-SCNC: 105 MMOL/L (ref 98–107)
CO2 SERPL-SCNC: 26.4 MMOL/L (ref 22–29)
CREAT SERPL-MCNC: 1.14 MG/DL (ref 0.76–1.27)
CRP SERPL-MCNC: 3.76 MG/DL (ref 0–0.5)
DEPRECATED RDW RBC AUTO: 53.6 FL (ref 37–54)
EGFRCR SERPLBLD CKD-EPI 2021: 76 ML/MIN/1.73
EOSINOPHIL # BLD AUTO: 0.09 10*3/MM3 (ref 0–0.4)
EOSINOPHIL NFR BLD AUTO: 1 % (ref 0.3–6.2)
ERYTHROCYTE [DISTWIDTH] IN BLOOD BY AUTOMATED COUNT: 14.6 % (ref 12.3–15.4)
FLUAV SUBTYP SPEC NAA+PROBE: NOT DETECTED
FLUBV RNA ISLT QL NAA+PROBE: NOT DETECTED
GEN 5 1HR TROPONIN T REFLEX: 877 NG/L
GLOBULIN UR ELPH-MCNC: 2.2 GM/DL
GLUCOSE SERPL-MCNC: 105 MG/DL (ref 65–99)
HCT VFR BLD AUTO: 35 % (ref 37.5–51)
HGB BLD-MCNC: 11.4 G/DL (ref 13–17.7)
IMM GRANULOCYTES # BLD AUTO: 0.02 10*3/MM3 (ref 0–0.05)
IMM GRANULOCYTES NFR BLD AUTO: 0.2 % (ref 0–0.5)
LYMPHOCYTES # BLD AUTO: 2.47 10*3/MM3 (ref 0.7–3.1)
LYMPHOCYTES NFR BLD AUTO: 28.6 % (ref 19.6–45.3)
MAGNESIUM SERPL-MCNC: 2 MG/DL (ref 1.6–2.6)
MCH RBC QN AUTO: 32.6 PG (ref 26.6–33)
MCHC RBC AUTO-ENTMCNC: 32.6 G/DL (ref 31.5–35.7)
MCV RBC AUTO: 100 FL (ref 79–97)
MONOCYTES # BLD AUTO: 0.77 10*3/MM3 (ref 0.1–0.9)
MONOCYTES NFR BLD AUTO: 8.9 % (ref 5–12)
NEUTROPHILS NFR BLD AUTO: 5.26 10*3/MM3 (ref 1.7–7)
NEUTROPHILS NFR BLD AUTO: 61 % (ref 42.7–76)
NRBC BLD AUTO-RTO: 0 /100 WBC (ref 0–0.2)
NT-PROBNP SERPL-MCNC: 399.2 PG/ML (ref 0–900)
PLATELET # BLD AUTO: 142 10*3/MM3 (ref 140–450)
PMV BLD AUTO: 10 FL (ref 6–12)
POTASSIUM SERPL-SCNC: 4.6 MMOL/L (ref 3.5–5.2)
PROCALCITONIN SERPL-MCNC: 0.04 NG/ML (ref 0–0.25)
PROT SERPL-MCNC: 6.2 G/DL (ref 6–8.5)
RBC # BLD AUTO: 3.5 10*6/MM3 (ref 4.14–5.8)
SARS-COV-2 RNA RESP QL NAA+PROBE: NOT DETECTED
SODIUM SERPL-SCNC: 141 MMOL/L (ref 136–145)
TROPONIN T DELTA: -189 NG/L
TROPONIN T SERPL HS-MCNC: 1066 NG/L
WBC NRBC COR # BLD AUTO: 8.64 10*3/MM3 (ref 3.4–10.8)

## 2024-12-04 PROCEDURE — 87636 SARSCOV2 & INF A&B AMP PRB: CPT | Performed by: STUDENT IN AN ORGANIZED HEALTH CARE EDUCATION/TRAINING PROGRAM

## 2024-12-04 PROCEDURE — 63710000001 PREDNISONE PER 5 MG: Performed by: STUDENT IN AN ORGANIZED HEALTH CARE EDUCATION/TRAINING PROGRAM

## 2024-12-04 PROCEDURE — 84484 ASSAY OF TROPONIN QUANT: CPT | Performed by: STUDENT IN AN ORGANIZED HEALTH CARE EDUCATION/TRAINING PROGRAM

## 2024-12-04 PROCEDURE — 83880 ASSAY OF NATRIURETIC PEPTIDE: CPT | Performed by: STUDENT IN AN ORGANIZED HEALTH CARE EDUCATION/TRAINING PROGRAM

## 2024-12-04 PROCEDURE — 80053 COMPREHEN METABOLIC PANEL: CPT | Performed by: STUDENT IN AN ORGANIZED HEALTH CARE EDUCATION/TRAINING PROGRAM

## 2024-12-04 PROCEDURE — 83735 ASSAY OF MAGNESIUM: CPT | Performed by: STUDENT IN AN ORGANIZED HEALTH CARE EDUCATION/TRAINING PROGRAM

## 2024-12-04 PROCEDURE — 71275 CT ANGIOGRAPHY CHEST: CPT

## 2024-12-04 PROCEDURE — 25510000001 IOPAMIDOL 61 % SOLUTION: Performed by: STUDENT IN AN ORGANIZED HEALTH CARE EDUCATION/TRAINING PROGRAM

## 2024-12-04 PROCEDURE — 71045 X-RAY EXAM CHEST 1 VIEW: CPT

## 2024-12-04 PROCEDURE — 84145 PROCALCITONIN (PCT): CPT | Performed by: STUDENT IN AN ORGANIZED HEALTH CARE EDUCATION/TRAINING PROGRAM

## 2024-12-04 PROCEDURE — 36415 COLL VENOUS BLD VENIPUNCTURE: CPT

## 2024-12-04 PROCEDURE — 86140 C-REACTIVE PROTEIN: CPT | Performed by: STUDENT IN AN ORGANIZED HEALTH CARE EDUCATION/TRAINING PROGRAM

## 2024-12-04 PROCEDURE — 93005 ELECTROCARDIOGRAM TRACING: CPT | Performed by: STUDENT IN AN ORGANIZED HEALTH CARE EDUCATION/TRAINING PROGRAM

## 2024-12-04 PROCEDURE — 99285 EMERGENCY DEPT VISIT HI MDM: CPT | Performed by: STUDENT IN AN ORGANIZED HEALTH CARE EDUCATION/TRAINING PROGRAM

## 2024-12-04 PROCEDURE — 85025 COMPLETE CBC W/AUTO DIFF WBC: CPT | Performed by: STUDENT IN AN ORGANIZED HEALTH CARE EDUCATION/TRAINING PROGRAM

## 2024-12-04 RX ORDER — IOPAMIDOL 612 MG/ML
100 INJECTION, SOLUTION INTRAVASCULAR
Status: COMPLETED | OUTPATIENT
Start: 2024-12-04 | End: 2024-12-04

## 2024-12-04 RX ORDER — ALBUTEROL SULFATE 90 UG/1
2 INHALANT RESPIRATORY (INHALATION) EVERY 4 HOURS PRN
Qty: 8 G | Refills: 0 | Status: SHIPPED | OUTPATIENT
Start: 2024-12-04

## 2024-12-04 RX ORDER — PREDNISONE 20 MG/1
40 TABLET ORAL DAILY
Qty: 10 TABLET | Refills: 0 | Status: SHIPPED | OUTPATIENT
Start: 2024-12-04 | End: 2024-12-09

## 2024-12-04 RX ADMIN — IOPAMIDOL 100 ML: 612 INJECTION, SOLUTION INTRAVENOUS at 15:32

## 2024-12-04 RX ADMIN — PREDNISONE 60 MG: 50 TABLET ORAL at 18:36

## 2024-12-04 NOTE — OUTREACH NOTE
This pt called  and left message requesting a nurse to call him back. He sounded SOB, and stated the reason for his call was that he was experiencing SOB, and was asking for advice. I returned his call and spoke to him. He sounded like he was having SOB. I advised him to go to ED, and if unable to get sister to take him as  he said, to call 911. Pt agreed with plan.

## 2024-12-04 NOTE — ED PROVIDER NOTES
Subjective:  History of Present Illness:    Patient is a 54-year-old male history of anxiety, asthma, atrial fibrillation now status post ablation, hypertension, hyperlipidemia who presents today with shortness of breath.  Reports increasing shortness of breath since his ablation in the last 24 hours.  Spoke with his cardiologist who recommended that he come to our emergency department for evaluation.  They requested to rule out of the pericardial effusion.  He denies any fevers.  Has had some cough and congestion.  Denies any abdominal pain nausea vomiting or diarrhea.  No leg swelling or leg pain.  Denies any personal history of PE/DVT.      Nurses Notes reviewed and agree, including vitals, allergies, social history and prior medical history.     REVIEW OF SYSTEMS: All systems reviewed and not pertinent unless noted.  Review of Systems   Constitutional:  Positive for activity change and fatigue. Negative for appetite change, chills and fever.   HENT:  Negative for congestion, sinus pressure, sneezing and trouble swallowing.    Eyes:  Negative for discharge and itching.   Respiratory:  Positive for cough and shortness of breath.    Cardiovascular:  Negative for chest pain and palpitations.   Gastrointestinal:  Negative for abdominal distention, abdominal pain, diarrhea, nausea and vomiting.   Endocrine: Negative for cold intolerance and heat intolerance.   Genitourinary:  Negative for decreased urine volume, dysuria and urgency.   Musculoskeletal:  Negative for gait problem, neck pain and neck stiffness.   Skin:  Negative for color change and rash.   Allergic/Immunologic: Negative for immunocompromised state.   Neurological:  Negative for facial asymmetry and headaches.   Hematological:  Negative for adenopathy.   Psychiatric/Behavioral:  Negative for self-injury and suicidal ideas.        Past Medical History:   Diagnosis Date    Anxiety     Asthma     Atrial fibrillation     GERD (gastroesophageal reflux  "disease)     Hearing loss     Hyperlipidemia     Hypertension     Low back pain     Muscle pain        Allergies:    Patient has no known allergies.      Past Surgical History:   Procedure Laterality Date    CARDIAC ELECTROPHYSIOLOGY PROCEDURE N/A 12/2/2024    Procedure: Ablation atrial fibrillation with PFA with carto; CTA prior; hold eliquis morning of procedure; no other meds to hold;  Surgeon: Thomas George MD;  Location: [facility] INVASIVE LOCATION;  Service: Cardiovascular;  Laterality: N/A;    CYSTECTOMY      BREAST AREA    HAND SURGERY      SMALL INTESTINE SURGERY      TONSILLECTOMY           Social History     Socioeconomic History    Marital status:    Tobacco Use    Smoking status: Never     Passive exposure: Never    Smokeless tobacco: Never   Vaping Use    Vaping status: Never Used    Passive vaping exposure: Yes   Substance and Sexual Activity    Alcohol use: Not Currently    Drug use: Never    Sexual activity: Defer         Family History   Problem Relation Age of Onset    Hypertension Mother     Cancer Father     Heart disease Sister     Heart disease Brother     Heart disease Maternal Grandmother     Heart disease Maternal Grandfather     Cancer Paternal Grandmother     Cancer Paternal Grandfather     Heart disease Maternal Aunt        Objective  Physical Exam:  /82 (BP Location: Left arm)   Pulse 76   Temp 97.3 °F (36.3 °C) (Oral)   Resp 19   Ht 182 cm (71.65\")   Wt 108 kg (238 lb 1.6 oz)   SpO2 92%   BMI 32.60 kg/m²      Physical Exam  Constitutional:       General: He is not in acute distress.     Appearance: Normal appearance. He is normal weight. He is not ill-appearing.   HENT:      Head: Normocephalic and atraumatic.      Nose: Nose normal. No congestion or rhinorrhea.      Mouth/Throat:      Mouth: Mucous membranes are moist.      Pharynx: Oropharynx is clear.   Eyes:      Extraocular Movements: Extraocular movements intact.      Conjunctiva/sclera: Conjunctivae normal. "      Pupils: Pupils are equal, round, and reactive to light.   Cardiovascular:      Rate and Rhythm: Normal rate and regular rhythm.      Pulses: Normal pulses.   Pulmonary:      Effort: Pulmonary effort is normal. No tachypnea, accessory muscle usage or respiratory distress.      Breath sounds: Normal breath sounds. No wheezing.   Abdominal:      General: Abdomen is flat. Bowel sounds are normal. There is no distension.      Palpations: Abdomen is soft.      Tenderness: There is no abdominal tenderness.   Musculoskeletal:         General: No swelling or tenderness. Normal range of motion.      Cervical back: Normal range of motion and neck supple. No rigidity or tenderness.   Skin:     General: Skin is warm and dry.      Capillary Refill: Capillary refill takes less than 2 seconds.   Neurological:      General: No focal deficit present.      Mental Status: He is alert and oriented to person, place, and time. Mental status is at baseline.      Cranial Nerves: No cranial nerve deficit.      Sensory: No sensory deficit.      Motor: No weakness.   Psychiatric:         Mood and Affect: Mood normal.         Behavior: Behavior normal.         Thought Content: Thought content normal.         Judgment: Judgment normal.         Procedures    ED Course:    ED Course as of 12/04/24 2302   Wed Dec 04, 2024   1526 EKG interpreted by me, normal sinus rhythm no concerning ST changes noted, rate of 77 [JE]      ED Course User Index  [JE] Dominic Street MD       Lab Results (last 24 hours)       Procedure Component Value Units Date/Time    CBC & Differential [[id number]]  (Abnormal) Collected: 12/04/24 1513    Specimen: Blood Updated: 12/04/24 1527    Narrative:      The following orders were created for panel order CBC & Differential.  Procedure                               Abnormality         Status                     ---------                               -----------         ------                     CBC Auto  Differential[[id number]]        Abnormal            Final result                 Please view results for these tests on the individual orders.    Comprehensive Metabolic Panel [[id number]]  (Abnormal) Collected: 12/04/24 1513    Specimen: Blood Updated: 12/04/24 1543     Glucose 105 mg/dL      BUN 23 mg/dL      Creatinine 1.14 mg/dL      Sodium 141 mmol/L      Potassium 4.6 mmol/L      Comment: Slight hemolysis detected by analyzer. Result may be falsely elevated.        Chloride 105 mmol/L      CO2 26.4 mmol/L      Calcium 9.1 mg/dL      Total Protein 6.2 g/dL      Albumin 4.0 g/dL      ALT (SGPT) 34 U/L      AST (SGOT) 38 U/L      Alkaline Phosphatase 54 U/L      Total Bilirubin 0.5 mg/dL      Globulin 2.2 gm/dL      A/G Ratio 1.8 g/dL      BUN/Creatinine Ratio 20.2     Anion Gap 9.6 mmol/L      eGFR 76.0 mL/min/1.73     Narrative:      GFR Normal >60  Chronic Kidney Disease <60  Kidney Failure <15      High Sensitivity Troponin T [[id number]]  (Abnormal) Collected: 12/04/24 1513    Specimen: Blood Updated: 12/04/24 1559     HS Troponin T 1,066 ng/L     Narrative:      High Sensitive Troponin T Reference Range:  <14.0 ng/L- Negative Female for AMI  <22.0 ng/L- Negative Male for AMI  >=14 - Abnormal Female indicating possible myocardial injury.  >=22 - Abnormal Male indicating possible myocardial injury.   Clinicians would have to utilize clinical acumen, EKG, Troponin, and serial changes to determine if it is an Acute Myocardial Infarction or myocardial injury due to an underlying chronic condition.         BNP [[id number]]  (Normal) Collected: 12/04/24 1513    Specimen: Blood Updated: 12/04/24 1546     proBNP 399.2 pg/mL     Narrative:      This assay is used as an aid in the diagnosis of individuals suspected of having heart failure. It can be used as an aid in the diagnosis of acute decompensated heart failure (ADHF) in patients presenting with signs and symptoms of ADHF to the emergency department (ED). In  "addition, NT-proBNP of <300 pg/mL indicates ADHF is not likely.    Age Range Result Interpretation  NT-proBNP Concentration (pg/mL:      <50             Positive            >450                   Gray                 300-450                    Negative             <300    50-75           Positive            >900                  Gray                300-900                  Negative            <300      >75             Positive            >1800                  Gray                300-1800                  Negative            <300    C-reactive Protein [[id number]]  (Abnormal) Collected: 12/04/24 1513    Specimen: Blood Updated: 12/04/24 1543     C-Reactive Protein 3.76 mg/dL     Procalcitonin [[id number]]  (Normal) Collected: 12/04/24 1513    Specimen: Blood Updated: 12/04/24 1551     Procalcitonin 0.04 ng/mL     Narrative:      As a Marker for Sepsis (Non-Neonates):    1. <0.5 ng/mL represents a low risk of severe sepsis and/or septic shock.  2. >2 ng/mL represents a high risk of severe sepsis and/or septic shock.    As a Marker for Lower Respiratory Tract Infections that require antibiotic therapy:    PCT on Admission    Antibiotic Therapy       6-12 Hrs later    >0.5                Strongly Recommended  >0.25 - <0.5        Recommended   0.1 - 0.25          Discouraged              Remeasure/reassess PCT  <0.1                Strongly Discouraged     Remeasure/reassess PCT    As 28 day mortality risk marker: \"Change in Procalcitonin Result\" (>80% or <=80%) if Day 0 (or Day 1) and Day 4 values are available. Refer to http://www.[facility]-pct-calculator.com    Change in PCT <=80%  A decrease of PCT levels below or equal to 80% defines a positive change in PCT test result representing a higher risk for 28-day all-cause mortality of patients diagnosed with severe sepsis for septic shock.    Change in PCT >80%  A decrease of PCT levels of more than 80% defines a negative change in PCT result representing a lower risk for " 28-day all-cause mortality of patients diagnosed with severe sepsis or septic shock.       Magnesium [[id number]]  (Normal) Collected: 12/04/24 1513    Specimen: Blood Updated: 12/04/24 1543     Magnesium 2.0 mg/dL     CBC Auto Differential [[id number]]  (Abnormal) Collected: 12/04/24 1513    Specimen: Blood Updated: 12/04/24 1527     WBC 8.64 10*3/mm3      RBC 3.50 10*6/mm3      Hemoglobin 11.4 g/dL      Hematocrit 35.0 %      .0 fL      MCH 32.6 pg      MCHC 32.6 g/dL      RDW 14.6 %      RDW-SD 53.6 fl      MPV 10.0 fL      Platelets 142 10*3/mm3      Neutrophil % 61.0 %      Lymphocyte % 28.6 %      Monocyte % 8.9 %      Eosinophil % 1.0 %      Basophil % 0.3 %      Immature Grans % 0.2 %      Neutrophils, Absolute 5.26 10*3/mm3      Lymphocytes, Absolute 2.47 10*3/mm3      Monocytes, Absolute 0.77 10*3/mm3      Eosinophils, Absolute 0.09 10*3/mm3      Basophils, Absolute 0.03 10*3/mm3      Immature Grans, Absolute 0.02 10*3/mm3      nRBC 0.0 /100 WBC     COVID-19 and FLU A/B PCR, 1 HR TAT - Swab, Nasopharynx [[id number]]  (Normal) Collected: 12/04/24 1519    Specimen: Swab from Nasopharynx Updated: 12/04/24 1546     COVID19 Not Detected     Influenza A PCR Not Detected     Influenza B PCR Not Detected    Narrative:      Fact sheet for providers: https://www.fda.gov/media/254115/download    Fact sheet for patients: https://www.fda.gov/media/884725/download    Test performed by PCR.    High Sensitivity Troponin T 2Hr [[id number]]  (Abnormal) Collected: 12/04/24 1802    Specimen: Blood Updated: 12/04/24 1840     HS Troponin T 877 ng/L      Troponin T Delta -189 ng/L     Narrative:      High Sensitive Troponin T Reference Range:  <14.0 ng/L- Negative Female for AMI  <22.0 ng/L- Negative Male for AMI  >=14 - Abnormal Female indicating possible myocardial injury.  >=22 - Abnormal Male indicating possible myocardial injury.   Clinicians would have to utilize clinical acumen, EKG, Troponin, and serial changes to  determine if it is an Acute Myocardial Infarction or myocardial injury due to an underlying chronic condition.                  CT Angiogram Chest Pulmonary Embolism    Result Date: 12/4/2024  PROCEDURE: CT ANGIOGRAM CHEST PULMONARY EMBOLISM-  HISTORY: Recent ablation, now with shortness of breath, eval PE, cardiac effusion  COMPARISON: August 26, 2024.  TECHNIQUE: The patient was injected with  IV contrast. Axial images were obtained through the chest in a CTA/ PE protocol. 3 D Reconstruction images were also performed. This study was performed with techniques to keep radiation doses as low as reasonably achievable, (ALARA). Individualized dose reduction techniques using automated exposure control or adjustment of mA and/or kV according to the patient size were employed.  FINDINGS: Left axillary lymph nodes are slightly more prominent compared to the right and there is mild infiltration of the fat around the left axillary artery and vein, correlate with site of previous cardiac catheterization. Thyroid gland appears normal with no focal mass. There is evidence of old calcified granulomatous disease.. Precarinal lymph node is mildly enlarged compared to prior, possibly reactive and now measures 19 mm in length. Mild enlargement of a prevascular lymph node is also identified. Consider follow-up in 8 to 12 weeks to document resolution. The heart is mildly enlarged. There is no pericardial or pleural effusion. No filling defects are identified to suggest PE. There is no evidence of thoracic aortic aneurysm or dissection. Limited images of the upper abdomen are unremarkable. There is new, mild posterior bilateral lower lobe suspected atelectasis with pneumonia felt to be less likely. Mild IVC distention is identified with minimal reflux into the IVC. Minor fissural nodule is stable laterally. Mildly prominent pulmonary vascularity noted.      Impression: No evidence of pulmonary embolism.  No pericardial effusion seen.   Cardiomegaly with pulmonary vascular congestion, consider mild fluid overload or early CHF.  Adenopathy that may be related to recent cardiac procedure; consider follow-up in 8 to 12 weeks to document resolution of adenopathy.   CTDI: 7.63 mGy DLP:283.76 mGy.cm  This report was signed and finalized on 12/4/2024 4:04 PM by Serena Caruso MD.      XR Chest 1 View    Result Date: 12/4/2024  PROCEDURE: XR CHEST 1 VW-  HISTORY: Shortness of breath since last night, had a heart cath on Monday.  COMPARISON: September 17, 2024..  FINDINGS: The heart is mildly enlarged.. There is pulmonary vascular congestion and new blunting of the left costophrenic angle suggesting small effusion. There is mildly increased interstitial disease bilaterally suggesting edema; consider fluid overload/CHF.. The mediastinum is unremarkable. There is no pneumothorax.  There are no acute osseous abnormalities. Apical lordotic positioning noted.      Impression: Findings suggesting mild fluid overload/CHF..     This report was signed and finalized on 12/4/2024 3:47 PM by Serena Caruso MD.          MDM      Initial impression of presenting illness: Shortness of breath    DDX: includes but is not limited to: Bacterial pneumonia, viral URI, PE, cardiac tamponade    Patient arrives stable with vitals interpreted by myself.     Pertinent features from physical exam: Clear to auscultation, heart regular rate and rhythm, no murmur, nontender to abdominal palpation.    Initial diagnostic plan: CBC, CMP, troponin, BNP, EKG, chest x-ray, CT PE    Results from initial plan were reviewed and interpreted by me revealing no concern for pneumonia, patient's BNP negative and thus CHF appears less likely, no concern for pericardial effusion on CT imaging, no concern for PE    Diagnostic information from other sources: Discussed with representative from patient's cardiology group on his arrival.    Interventions / Re-evaluation: Given concerns for possible asthma as  etiology of patient's intermittent shortness of breath, given prednisone in the emergency department    Results/clinical rationale were discussed with patient family at bedside    Consultations/Discussion of results with other physicians: Discussed no concern for bacterial pneumonia or PE.  No concern for cardiac effusion.  Given patient's lack of findings on imaging, suspect asthma likely etiology of patient's symptoms especially in the setting of recent sedation and ablation.  Have given patient steroid taper and encouraged him to follow closely with cardiology as well as his primary care doctor to ensure resolution of symptoms given strict return precaution for severe increase in shortness of breath or chest pain.  Patient requesting referral to pulmonology as they are concerned about their primary care doctors management of asthma and I have sent this referral per their request.    Disposition plan: Discharge  -----        Final diagnoses:   Shortness of breath   Moderate persistent asthma with acute exacerbation          Dominic Street MD  12/04/24 [id number]

## 2024-12-04 NOTE — DISCHARGE INSTRUCTIONS
You were evaluated for shortness of breath.  We got labs and CT scan of your chest and found no concerns for blood clot or pericardial effusion.  We believe your symptoms may be related to your sedation for your procedure and your asthma.  We have given you steroids in the emergency department you are now stable for discharge.  We have sent a course short course of steroids to your pharmacy and a an albuterol inhaler.  We would recommend continue to follow with your cardiologist and per your request, we have sent referral to a pulmonologist.  You have severe increase in shortness of breath, please come back to the to the emergency department for further evaluation.  You are now stable for discharge.

## 2024-12-04 NOTE — Clinical Note
[facility] EMERGENCY DEPARTMENT  [address] [address]  Phone: [phone]    Sheng Dominguez was seen and treated in our emergency department on 12/4/2024.  He may return to work on 12/07/2024.         Thank you for choosing [facility].    Dominic Street MD

## 2024-12-05 ENCOUNTER — TELEPHONE (OUTPATIENT)
Dept: CARDIOLOGY | Facility: CLINIC | Age: 54
End: 2024-12-05
Payer: COMMERCIAL

## 2024-12-05 RX ORDER — FUROSEMIDE 20 MG/1
20 TABLET ORAL DAILY
Qty: 20 TABLET | Refills: 0 | Status: SHIPPED | OUTPATIENT
Start: 2024-12-05

## 2024-12-05 NOTE — TELEPHONE ENCOUNTER
Called and spoke with patient and spouse. Had discussion related to Dr. George's recommendation and they are in agreement. Will send prescription to [facility] in [place] for lasix. Educated patient and spouse on use of diuretics and side effects to look for. They voiced understanding and will call us on Monday to let us know how the lasix worked over the weekend.

## 2024-12-05 NOTE — TELEPHONE ENCOUNTER
Patient's spouse called the office this morning in regards to the patient being evaluated last evening at [facility] for shortness of breath s/p ablation 12/2.  Spouse reports that they are skeptical of information they were told and would like Dr. George to take a look at the ED record and give his input.

## 2024-12-09 LAB
ACT BLD: 367 SECONDS (ref 82–152)
ACT BLD: 385 SECONDS (ref 82–152)
ACT BLD: 403 SECONDS (ref 82–152)
ACT BLD: 415 SECONDS (ref 82–152)

## 2024-12-10 ENCOUNTER — TELEPHONE (OUTPATIENT)
Dept: CARDIOLOGY | Facility: HOSPITAL | Age: 54
End: 2024-12-10
Payer: COMMERCIAL

## 2024-12-10 NOTE — TELEPHONE ENCOUNTER
Overdue Results    Order Name Placed Expected Extend Order Cancel Order Order Details   BASIC METABOLIC PANEL 10/31/24 11/14/24  Extend  Cancel  Order Details     Basic Metabolic Panel [LAB15] (Order [id number])  Order  Date: 10/31/2024 Department: [facility] CARDIOLOGY Ordering/Authorizing: Michele Lance APRN     Future Order Information    Expected Expires      11/14/2024 (Approximate) 10/31/2025             Results  Basic Metabolic Panel (Order [id number])      Provider Comment To Patient     Not Released  Not seen      Lab and Collection    Basic Metabolic Panel (Order: [id number]) - 10/31/2024      Please note, patient has completed labs more recently in another order. See below.  Lab Results   Component Value Date    GLUCOSE 105 (H) 12/04/2024    BUN 23 (H) 12/04/2024    CREATININE 1.14 12/04/2024     12/04/2024    K 4.6 12/04/2024     12/04/2024    CALCIUM 9.1 12/04/2024    PROTEINTOT 6.2 12/04/2024    ALBUMIN 4.0 12/04/2024    ALT 34 12/04/2024    AST 38 12/04/2024    ALKPHOS 54 12/04/2024    BILITOT 0.5 12/04/2024    GLOB 2.2 12/04/2024    AGRATIO 1.8 12/04/2024    BCR 20.2 12/04/2024    ANIONGAP 9.6 12/04/2024    EGFR 76.0 12/04/2024

## 2025-01-02 ENCOUNTER — TELEPHONE (OUTPATIENT)
Dept: CARDIOLOGY | Facility: HOSPITAL | Age: 55
End: 2025-01-02

## 2025-01-02 ENCOUNTER — OFFICE VISIT (OUTPATIENT)
Dept: CARDIOLOGY | Facility: HOSPITAL | Age: 55
End: 2025-01-02
Payer: COMMERCIAL

## 2025-01-02 VITALS
OXYGEN SATURATION: 96 % | BODY MASS INDEX: 32.83 KG/M2 | HEIGHT: 72 IN | HEART RATE: 50 BPM | WEIGHT: 242.38 LBS | RESPIRATION RATE: 16 BRPM | DIASTOLIC BLOOD PRESSURE: 74 MMHG | SYSTOLIC BLOOD PRESSURE: 134 MMHG

## 2025-01-02 DIAGNOSIS — I48.19 PERSISTENT ATRIAL FIBRILLATION: Primary | ICD-10-CM

## 2025-01-02 DIAGNOSIS — Z91.89 AT RISK FOR SLEEP APNEA: ICD-10-CM

## 2025-01-02 DIAGNOSIS — Z98.890 HISTORY OF ELECTROPHYSIOLOGIC STUDY: ICD-10-CM

## 2025-01-02 DIAGNOSIS — I25.10 CORONARY ARTERY DISEASE INVOLVING NATIVE CORONARY ARTERY OF NATIVE HEART, UNSPECIFIED WHETHER ANGINA PRESENT: ICD-10-CM

## 2025-01-02 RX ORDER — MONTELUKAST SODIUM 10 MG/1
TABLET ORAL
COMMUNITY
Start: 2024-12-29

## 2025-01-02 RX ORDER — IPRATROPIUM BROMIDE 42 UG/1
SPRAY, METERED NASAL
COMMUNITY
Start: 2024-12-29

## 2025-01-02 RX ORDER — FLUTICASONE PROPIONATE AND SALMETEROL 250; 50 UG/1; UG/1
POWDER RESPIRATORY (INHALATION)
COMMUNITY
Start: 2024-12-29

## 2025-01-02 RX ORDER — FLUTICASONE PROPIONATE 50 MCG
SPRAY, SUSPENSION (ML) NASAL
COMMUNITY
Start: 2024-12-29

## 2025-01-02 NOTE — PROGRESS NOTES
"[facility], [facility]    Chief Complaint  Atrial Fibrillation and Follow-up    Subjective    History of Present Illness {CC  Problem List  Visit  Diagnosis   Encounters  Notes  Medications  Labs  Result Review Imaging  Media :23}     Sheng Dominguez presents to [facility] CARDIOLOGY for   History of Present Illness     55 yo male with hx of persistent, seizure disorder, hypertension, dyslipidemia, memory impairment, anxiety, asthma.    Patient presented for possible ablation on 12/2/24 with Dr. George.  Continued on Eliquis, beta-blocker.    Patient presented to [facility] ED on 12/4/2024 with shortness of breath. Tx for asthma exacerbation.  Dr. George added Lasix 20 mg daily for short course.  Patient completed.    Patient denies chest pain, pressure, palpitations.  Dyspnea has significantly improved.  No dizziness, near-syncope, syncope, edema    Patient had been seen by sleep medicine.  Was awaiting to be scheduled for sleep study.  Has not been scheduled.    Objective     Vital Signs:   Vitals:    01/02/25 1340   BP: 134/74   BP Location: Left arm   Patient Position: Sitting   Cuff Size: Adult   Pulse: 50   Resp: 16   SpO2: 96%   Weight: 110 kg (242 lb 6 oz)   Height: 182 cm (71.65\")     Body mass index is 33.19 kg/m².  Physical Exam  Vitals reviewed.   Constitutional:       General: He is not in acute distress.  Cardiovascular:      Rate and Rhythm: Normal rate and regular rhythm.   Pulmonary:      Effort: Pulmonary effort is normal.      Breath sounds: Normal breath sounds.   Musculoskeletal:      Right lower leg: No edema.      Left lower leg: No edema.   Skin:     Coloration: Skin is not pale.   Neurological:      Mental Status: He is alert.   Psychiatric:         Mood and Affect: Mood normal.         Behavior: Behavior normal. Behavior is cooperative.              Result Review  Data Reviewed:{ Labs  Result Review  Imaging  Med " "Tab  Media :23}   Myocardial perfusion stress test 8/28/2024: No ischemia, diffuse multivessel coronary calcification    Echo 8/2024 ([facility]): With normal EF and no significant VHD-data deficit     Lab Results   Component Value Date    WBC 8.64 12/04/2024    HGB 11.4 (L) 12/04/2024    HCT 35.0 (L) 12/04/2024    .0 (H) 12/04/2024     12/04/2024     Lab Results   Component Value Date    GLUCOSE 105 (H) 12/04/2024    CALCIUM 9.1 12/04/2024     12/04/2024    K 4.6 12/04/2024    CO2 26.4 12/04/2024     12/04/2024    BUN 23 (H) 12/04/2024    CREATININE 1.14 12/04/2024    EGFR 76.0 12/04/2024    BCR 20.2 12/04/2024    ANIONGAP 9.6 12/04/2024     No results found for: \"TSH\"  No results found for: \"CHOL\", \"CHLPL\"  No results found for: \"TRIG\"  No results found for: \"HDL\"  No results found for: \"LDL\", \"LDLDIRECT\"                Assessment and Plan {CC Problem List  Visit Diagnosis  ROS  Review (Popup)  Health Maintenance  Quality  BestPractice  Medications  SmartSets  SnapShot Encounters  Media :23}   1. Persistent atrial fibrillation  Status post PVI  Heart rate regular to auscultation  Continued on Eliquis without any signs and symptoms of bleeding  Beta-blocker    Dyspnea has improved significantly    2. Coronary artery disease involving native coronary artery of native heart, unspecified whether angina present  Aspirin, statin  No chest pain or pressure    3. History of electrophysiologic study  Discussed postprocedural expectations and management  Reviewed the importance of sleep apnea identification and treatment if indicated, blood pressure control, routine exercise, weight management  4. At risk for sleep apnea  Patient has been seen with sleep medicine.  Awaiting to schedule sleep study.  Will have staff follow-up with sleep medicine to assist with scheduling.          Follow Up {Instructions Charge Capture  Follow-up Communications :23}   Return if symptoms " worsen or fail to improve.    Patient was given instructions and counseling regarding his condition or for health maintenance advice. Please see specific information pulled into the AVS if appropriate.  Patient was instructed to call the [facility] with any questions, concerns, or worsening symptoms.

## 2025-01-02 NOTE — TELEPHONE ENCOUNTER
----- Message from Michele Lance sent at 1/2/2025  2:14 PM EST -----  A sleep referral has been completed for sleep medicine.  Patient had an appointment where there was discussion of scheduling sleep study.  Patient has not had a follow-up visit since then.  His last visit was in September.  Can you reach out to sleep medicine to coordinate scheduling.  Patient is interested in completing sleep study as recently discussed.  There may be a gap in care that could be addressed.

## 2025-01-09 ENCOUNTER — HOSPITAL ENCOUNTER (OUTPATIENT)
Facility: HOSPITAL | Age: 55
Setting detail: OBSERVATION
Discharge: HOME | End: 2025-01-11
Attending: STUDENT IN AN ORGANIZED HEALTH CARE EDUCATION/TRAINING PROGRAM | Admitting: STUDENT IN AN ORGANIZED HEALTH CARE EDUCATION/TRAINING PROGRAM
Payer: COMMERCIAL

## 2025-01-09 ENCOUNTER — APPOINTMENT (OUTPATIENT)
Dept: GENERAL RADIOLOGY | Facility: HOSPITAL | Age: 55
End: 2025-01-09
Payer: COMMERCIAL

## 2025-01-09 ENCOUNTER — APPOINTMENT (OUTPATIENT)
Dept: CT IMAGING | Facility: HOSPITAL | Age: 55
End: 2025-01-09
Payer: COMMERCIAL

## 2025-01-09 ENCOUNTER — APPOINTMENT (OUTPATIENT)
Dept: MRI IMAGING | Facility: HOSPITAL | Age: 55
End: 2025-01-09
Payer: COMMERCIAL

## 2025-01-09 DIAGNOSIS — R47.81 SLURRED SPEECH: Primary | ICD-10-CM

## 2025-01-09 LAB
ALBUMIN SERPL-MCNC: 4.6 G/DL (ref 3.5–5.2)
ALBUMIN/GLOB SERPL: 1.9 G/DL
ALP SERPL-CCNC: 56 U/L (ref 39–117)
ALT SERPL W P-5'-P-CCNC: 19 U/L (ref 1–41)
AMMONIA BLD-SCNC: 18 UMOL/L (ref 16–60)
ANION GAP SERPL CALCULATED.3IONS-SCNC: 9.5 MMOL/L (ref 5–15)
AST SERPL-CCNC: 23 U/L (ref 1–40)
BASOPHILS # BLD AUTO: 0.02 10*3/MM3 (ref 0–0.2)
BASOPHILS NFR BLD AUTO: 0.4 % (ref 0–1.5)
BILIRUB SERPL-MCNC: 0.3 MG/DL (ref 0–1.2)
BILIRUB UR QL STRIP: NEGATIVE
BUN SERPL-MCNC: 14 MG/DL (ref 6–20)
BUN/CREAT SERPL: 13.7 (ref 7–25)
CALCIUM SPEC-SCNC: 9.6 MG/DL (ref 8.6–10.5)
CHLORIDE SERPL-SCNC: 106 MMOL/L (ref 98–107)
CHOLEST SERPL-MCNC: 151 MG/DL (ref 0–200)
CLARITY UR: CLEAR
CO2 SERPL-SCNC: 26.5 MMOL/L (ref 22–29)
COLOR UR: YELLOW
CREAT SERPL-MCNC: 1.02 MG/DL (ref 0.76–1.27)
DEPRECATED RDW RBC AUTO: 45.7 FL (ref 37–54)
EGFRCR SERPLBLD CKD-EPI 2021: 86.8 ML/MIN/1.73
EOSINOPHIL # BLD AUTO: 0.17 10*3/MM3 (ref 0–0.4)
EOSINOPHIL NFR BLD AUTO: 3.4 % (ref 0.3–6.2)
ERYTHROCYTE [DISTWIDTH] IN BLOOD BY AUTOMATED COUNT: 12.7 % (ref 12.3–15.4)
FOLATE SERPL-MCNC: 18.5 NG/ML (ref 4.78–24.2)
GEN 5 1HR TROPONIN T REFLEX: 11 NG/L
GLOBULIN UR ELPH-MCNC: 2.4 GM/DL
GLUCOSE SERPL-MCNC: 100 MG/DL (ref 65–99)
GLUCOSE UR STRIP-MCNC: NEGATIVE MG/DL
HBA1C MFR BLD: 5.7 % (ref 4.8–5.6)
HCT VFR BLD AUTO: 41 % (ref 37.5–51)
HDLC SERPL-MCNC: 51 MG/DL (ref 40–60)
HGB BLD-MCNC: 13.8 G/DL (ref 13–17.7)
HGB UR QL STRIP.AUTO: NEGATIVE
HOLD SPECIMEN: NORMAL
HOLD SPECIMEN: NORMAL
IMM GRANULOCYTES # BLD AUTO: 0.02 10*3/MM3 (ref 0–0.05)
IMM GRANULOCYTES NFR BLD AUTO: 0.4 % (ref 0–0.5)
INR PPP: 1.16 (ref 0.9–1.1)
KETONES UR QL STRIP: NEGATIVE
LDLC SERPL CALC-MCNC: 78 MG/DL (ref 0–100)
LDLC/HDLC SERPL: 1.48 {RATIO}
LEUKOCYTE ESTERASE UR QL STRIP.AUTO: NEGATIVE
LYMPHOCYTES # BLD AUTO: 2.12 10*3/MM3 (ref 0.7–3.1)
LYMPHOCYTES NFR BLD AUTO: 42.8 % (ref 19.6–45.3)
MCH RBC QN AUTO: 32.9 PG (ref 26.6–33)
MCHC RBC AUTO-ENTMCNC: 33.7 G/DL (ref 31.5–35.7)
MCV RBC AUTO: 97.6 FL (ref 79–97)
MONOCYTES # BLD AUTO: 0.39 10*3/MM3 (ref 0.1–0.9)
MONOCYTES NFR BLD AUTO: 7.9 % (ref 5–12)
NEUTROPHILS NFR BLD AUTO: 2.23 10*3/MM3 (ref 1.7–7)
NEUTROPHILS NFR BLD AUTO: 45.1 % (ref 42.7–76)
NITRITE UR QL STRIP: NEGATIVE
NRBC BLD AUTO-RTO: 0 /100 WBC (ref 0–0.2)
PH UR STRIP.AUTO: 5.5 [PH] (ref 5–8)
PLATELET # BLD AUTO: 163 10*3/MM3 (ref 140–450)
PMV BLD AUTO: 10 FL (ref 6–12)
POTASSIUM SERPL-SCNC: 4.3 MMOL/L (ref 3.5–5.2)
PROT SERPL-MCNC: 7 G/DL (ref 6–8.5)
PROT UR QL STRIP: NEGATIVE
PROTHROMBIN TIME: 15.3 SECONDS (ref 12.3–15.1)
RBC # BLD AUTO: 4.2 10*6/MM3 (ref 4.14–5.8)
SODIUM SERPL-SCNC: 142 MMOL/L (ref 136–145)
SP GR UR STRIP: 1.02 (ref 1–1.03)
TRIGL SERPL-MCNC: 122 MG/DL (ref 0–150)
TROPONIN T NUMERIC DELTA: 1 NG/L
TROPONIN T SERPL HS-MCNC: 10 NG/L
TSH SERPL DL<=0.05 MIU/L-ACNC: 1.5 UIU/ML (ref 0.27–4.2)
UROBILINOGEN UR QL STRIP: NORMAL
VALPROATE SERPL-MCNC: 82.9 MCG/ML (ref 50–125)
VIT B12 BLD-MCNC: 1006 PG/ML (ref 211–946)
VLDLC SERPL-MCNC: 22 MG/DL (ref 5–40)
WBC NRBC COR # BLD AUTO: 4.95 10*3/MM3 (ref 3.4–10.8)
WHOLE BLOOD HOLD COAG: NORMAL
WHOLE BLOOD HOLD SPECIMEN: NORMAL

## 2025-01-09 PROCEDURE — 70498 CT ANGIOGRAPHY NECK: CPT

## 2025-01-09 PROCEDURE — G0378 HOSPITAL OBSERVATION PER HR: HCPCS

## 2025-01-09 PROCEDURE — 99222 1ST HOSP IP/OBS MODERATE 55: CPT | Performed by: STUDENT IN AN ORGANIZED HEALTH CARE EDUCATION/TRAINING PROGRAM

## 2025-01-09 PROCEDURE — 70551 MRI BRAIN STEM W/O DYE: CPT

## 2025-01-09 PROCEDURE — 81003 URINALYSIS AUTO W/O SCOPE: CPT | Performed by: STUDENT IN AN ORGANIZED HEALTH CARE EDUCATION/TRAINING PROGRAM

## 2025-01-09 PROCEDURE — 71275 CT ANGIOGRAPHY CHEST: CPT

## 2025-01-09 PROCEDURE — 70496 CT ANGIOGRAPHY HEAD: CPT

## 2025-01-09 PROCEDURE — 82607 VITAMIN B-12: CPT | Performed by: STUDENT IN AN ORGANIZED HEALTH CARE EDUCATION/TRAINING PROGRAM

## 2025-01-09 PROCEDURE — 84484 ASSAY OF TROPONIN QUANT: CPT | Performed by: STUDENT IN AN ORGANIZED HEALTH CARE EDUCATION/TRAINING PROGRAM

## 2025-01-09 PROCEDURE — 99204 OFFICE O/P NEW MOD 45 MIN: CPT | Performed by: INTERNAL MEDICINE

## 2025-01-09 PROCEDURE — 70450 CT HEAD/BRAIN W/O DYE: CPT

## 2025-01-09 PROCEDURE — 83036 HEMOGLOBIN GLYCOSYLATED A1C: CPT | Performed by: STUDENT IN AN ORGANIZED HEALTH CARE EDUCATION/TRAINING PROGRAM

## 2025-01-09 PROCEDURE — 99285 EMERGENCY DEPT VISIT HI MDM: CPT | Performed by: STUDENT IN AN ORGANIZED HEALTH CARE EDUCATION/TRAINING PROGRAM

## 2025-01-09 PROCEDURE — 84443 ASSAY THYROID STIM HORMONE: CPT | Performed by: STUDENT IN AN ORGANIZED HEALTH CARE EDUCATION/TRAINING PROGRAM

## 2025-01-09 PROCEDURE — 85610 PROTHROMBIN TIME: CPT | Performed by: STUDENT IN AN ORGANIZED HEALTH CARE EDUCATION/TRAINING PROGRAM

## 2025-01-09 PROCEDURE — 80177 DRUG SCRN QUAN LEVETIRACETAM: CPT | Performed by: STUDENT IN AN ORGANIZED HEALTH CARE EDUCATION/TRAINING PROGRAM

## 2025-01-09 PROCEDURE — 25510000001 IOPAMIDOL 61 % SOLUTION: Performed by: STUDENT IN AN ORGANIZED HEALTH CARE EDUCATION/TRAINING PROGRAM

## 2025-01-09 PROCEDURE — 85025 COMPLETE CBC W/AUTO DIFF WBC: CPT | Performed by: STUDENT IN AN ORGANIZED HEALTH CARE EDUCATION/TRAINING PROGRAM

## 2025-01-09 PROCEDURE — 82140 ASSAY OF AMMONIA: CPT | Performed by: STUDENT IN AN ORGANIZED HEALTH CARE EDUCATION/TRAINING PROGRAM

## 2025-01-09 PROCEDURE — 0042T HC CT CEREBRAL PERFUSION W/WO CONTRAST: CPT

## 2025-01-09 PROCEDURE — 80061 LIPID PANEL: CPT | Performed by: STUDENT IN AN ORGANIZED HEALTH CARE EDUCATION/TRAINING PROGRAM

## 2025-01-09 PROCEDURE — 93005 ELECTROCARDIOGRAM TRACING: CPT | Performed by: STUDENT IN AN ORGANIZED HEALTH CARE EDUCATION/TRAINING PROGRAM

## 2025-01-09 PROCEDURE — 80053 COMPREHEN METABOLIC PANEL: CPT | Performed by: STUDENT IN AN ORGANIZED HEALTH CARE EDUCATION/TRAINING PROGRAM

## 2025-01-09 PROCEDURE — 71045 X-RAY EXAM CHEST 1 VIEW: CPT

## 2025-01-09 PROCEDURE — 80164 ASSAY DIPROPYLACETIC ACD TOT: CPT | Performed by: STUDENT IN AN ORGANIZED HEALTH CARE EDUCATION/TRAINING PROGRAM

## 2025-01-09 PROCEDURE — 82746 ASSAY OF FOLIC ACID SERUM: CPT | Performed by: STUDENT IN AN ORGANIZED HEALTH CARE EDUCATION/TRAINING PROGRAM

## 2025-01-09 RX ORDER — ALPRAZOLAM 0.5 MG
1 TABLET ORAL 3 TIMES DAILY
Status: DISCONTINUED | OUTPATIENT
Start: 2025-01-09 | End: 2025-01-11 | Stop reason: HOSPADM

## 2025-01-09 RX ORDER — SODIUM CHLORIDE 0.9 % (FLUSH) 0.9 %
10 SYRINGE (ML) INJECTION AS NEEDED
Status: DISCONTINUED | OUTPATIENT
Start: 2025-01-09 | End: 2025-01-11 | Stop reason: HOSPADM

## 2025-01-09 RX ORDER — NITROGLYCERIN 0.4 MG/1
0.4 TABLET SUBLINGUAL
Status: DISCONTINUED | OUTPATIENT
Start: 2025-01-09 | End: 2025-01-11 | Stop reason: HOSPADM

## 2025-01-09 RX ORDER — LEVETIRACETAM 500 MG/1
1000 TABLET ORAL 2 TIMES DAILY
Status: DISCONTINUED | OUTPATIENT
Start: 2025-01-09 | End: 2025-01-11 | Stop reason: HOSPADM

## 2025-01-09 RX ORDER — TRAZODONE HYDROCHLORIDE 50 MG/1
150 TABLET, FILM COATED ORAL NIGHTLY
Status: DISCONTINUED | OUTPATIENT
Start: 2025-01-09 | End: 2025-01-11 | Stop reason: HOSPADM

## 2025-01-09 RX ORDER — MONTELUKAST SODIUM 10 MG/1
10 TABLET ORAL NIGHTLY
Status: DISCONTINUED | OUTPATIENT
Start: 2025-01-09 | End: 2025-01-11 | Stop reason: HOSPADM

## 2025-01-09 RX ORDER — METOPROLOL TARTRATE 25 MG/1
25 TABLET, FILM COATED ORAL 2 TIMES DAILY
Status: DISCONTINUED | OUTPATIENT
Start: 2025-01-09 | End: 2025-01-11 | Stop reason: HOSPADM

## 2025-01-09 RX ORDER — ATORVASTATIN CALCIUM 20 MG/1
20 TABLET, FILM COATED ORAL NIGHTLY
Status: DISCONTINUED | OUTPATIENT
Start: 2025-01-09 | End: 2025-01-11 | Stop reason: HOSPADM

## 2025-01-09 RX ORDER — CETIRIZINE HYDROCHLORIDE 10 MG/1
5 TABLET ORAL DAILY PRN
Status: DISCONTINUED | OUTPATIENT
Start: 2025-01-09 | End: 2025-01-11 | Stop reason: HOSPADM

## 2025-01-09 RX ORDER — PANTOPRAZOLE SODIUM 40 MG/1
40 TABLET, DELAYED RELEASE ORAL DAILY
Status: DISCONTINUED | OUTPATIENT
Start: 2025-01-10 | End: 2025-01-11 | Stop reason: HOSPADM

## 2025-01-09 RX ORDER — SODIUM CHLORIDE 9 MG/ML
40 INJECTION, SOLUTION INTRAVENOUS AS NEEDED
Status: DISCONTINUED | OUTPATIENT
Start: 2025-01-09 | End: 2025-01-11 | Stop reason: HOSPADM

## 2025-01-09 RX ORDER — ATORVASTATIN CALCIUM 80 MG/1
80 TABLET, FILM COATED ORAL NIGHTLY
Status: DISCONTINUED | OUTPATIENT
Start: 2025-01-09 | End: 2025-01-11 | Stop reason: HOSPADM

## 2025-01-09 RX ORDER — SODIUM CHLORIDE 0.9 % (FLUSH) 0.9 %
10 SYRINGE (ML) INJECTION EVERY 12 HOURS SCHEDULED
Status: DISCONTINUED | OUTPATIENT
Start: 2025-01-09 | End: 2025-01-11 | Stop reason: HOSPADM

## 2025-01-09 RX ORDER — ASPIRIN 81 MG/1
81 TABLET ORAL DAILY
Status: DISCONTINUED | OUTPATIENT
Start: 2025-01-10 | End: 2025-01-11 | Stop reason: HOSPADM

## 2025-01-09 RX ORDER — IOPAMIDOL 612 MG/ML
100 INJECTION, SOLUTION INTRAVASCULAR
Status: COMPLETED | OUTPATIENT
Start: 2025-01-09 | End: 2025-01-09

## 2025-01-09 RX ORDER — GABAPENTIN 300 MG/1
600 CAPSULE ORAL EVERY 8 HOURS SCHEDULED
Status: DISCONTINUED | OUTPATIENT
Start: 2025-01-09 | End: 2025-01-10

## 2025-01-09 RX ORDER — DIVALPROEX SODIUM 500 MG/1
1000 TABLET, DELAYED RELEASE ORAL 2 TIMES DAILY
Status: DISCONTINUED | OUTPATIENT
Start: 2025-01-09 | End: 2025-01-11 | Stop reason: HOSPADM

## 2025-01-09 RX ORDER — BUDESONIDE AND FORMOTEROL FUMARATE DIHYDRATE 160; 4.5 UG/1; UG/1
2 AEROSOL RESPIRATORY (INHALATION)
Status: DISCONTINUED | OUTPATIENT
Start: 2025-01-09 | End: 2025-01-11 | Stop reason: HOSPADM

## 2025-01-09 RX ORDER — ONDANSETRON 2 MG/ML
4 INJECTION INTRAMUSCULAR; INTRAVENOUS EVERY 6 HOURS PRN
Status: DISCONTINUED | OUTPATIENT
Start: 2025-01-09 | End: 2025-01-11 | Stop reason: HOSPADM

## 2025-01-09 RX ADMIN — IOPAMIDOL 100 ML: 612 INJECTION, SOLUTION INTRAVENOUS at 16:59

## 2025-01-09 RX ADMIN — ATORVASTATIN CALCIUM 20 MG: 20 TABLET, FILM COATED ORAL at 21:55

## 2025-01-09 RX ADMIN — ALPRAZOLAM 1 MG: 0.5 TABLET ORAL at 21:55

## 2025-01-09 RX ADMIN — MONTELUKAST 10 MG: 10 TABLET, FILM COATED ORAL at 21:56

## 2025-01-09 RX ADMIN — GABAPENTIN 600 MG: 300 CAPSULE ORAL at 21:56

## 2025-01-09 RX ADMIN — ATORVASTATIN CALCIUM 80 MG: 80 TABLET, FILM COATED ORAL at 21:55

## 2025-01-09 RX ADMIN — DIVALPROEX SODIUM 1000 MG: 500 TABLET, DELAYED RELEASE ORAL at 21:56

## 2025-01-09 RX ADMIN — IOPAMIDOL 100 ML: 612 INJECTION, SOLUTION INTRAVENOUS at 16:45

## 2025-01-09 RX ADMIN — LEVETIRACETAM 1000 MG: 500 TABLET, FILM COATED ORAL at 21:54

## 2025-01-09 RX ADMIN — TRAZODONE HYDROCHLORIDE 150 MG: 50 TABLET ORAL at 21:56

## 2025-01-09 RX ADMIN — APIXABAN 5 MG: 5 TABLET, FILM COATED ORAL at 21:55

## 2025-01-09 NOTE — ED PROVIDER NOTES
[facility]  Emergency Department Encounter  Emergency Medicine Physician Note       Pt Name: Sheng Dominguez  MRN: [id number]  Pt :   1970  Room Number:  424/1  Date of encounter:  2025  PCP: Tracey Adam APRN  ED Physician: Zion Orourke DO    HPI:  Sheng Dominguez is a 54 y.o. male who presents to the ED with chief complaint of SLURRED SPEECH.  Patient is companied by his daughter who contributes to HPI.  Last known normal was around 7 AM this morning.  Patient went to sleep for a nap and woke up around 1 PM today with slurred speech, headache, and dizziness.  Duration constant.  No modifying factors.  Slurred speech is described as difficulty talking.  Denies chest pain, shortness of breath, neck pain, abdominal pain, problems with urination or bowel movements.    Pertinent past medical history: A-fib on Eliquis status post recent cardiac ablation, hypertension, hyperlipidemia, myoclonic seizures.  Currently on Depakote and Keppra.  Follows with neurology in [place].    PAST MEDICAL HISTORY  Past Medical History:   Diagnosis Date    Anxiety     Asthma     Atrial fibrillation     GERD (gastroesophageal reflux disease)     Hearing loss     Hyperlipidemia     Hypertension     Low back pain     Muscle pain      Current Outpatient Medications   Medication Instructions    albuterol sulfate  (90 Base) MCG/ACT inhaler 2 puffs, Inhalation, Every 4 Hours PRN    ALPRAZolam (XANAX) 1 mg, 3 Times Daily    apixaban (ELIQUIS) 5 mg, Oral, Every 12 Hours Scheduled    aspirin 81 mg, Oral, Daily    atorvastatin (LIPITOR) 20 mg, Oral, Nightly    cetirizine (ZYRTEC) 5 mg, Daily PRN    coenzyme Q10 50 mg, Daily    divalproex (DEPAKOTE) 1,000 mg, 2 Times Daily    fluticasone (FLONASE) 50 MCG/ACT nasal spray     Fluticasone-Salmeterol (ADVAIR/WIXELA) 250-50 MCG/ACT DISKUS     gabapentin (NEURONTIN) 600 mg, 3 Times Daily    hyoscyamine (ANASPAZ,LEVSIN) 0.125 MG tablet Take 1 tablet by mouth  Every 6 (Six) Hours As Needed for Cramping or Diarrhea.    ipratropium (ATROVENT) 0.06 % nasal spray     levETIRAcetam (KEPPRA) 1,000 mg, 2 Times Daily    Magnesium Oxide -Mg Supplement 400 mg, Daily    metoprolol tartrate (LOPRESSOR) 25 mg, 2 Times Daily    montelukast (SINGULAIR) 10 MG tablet     Omega-3 1000 MG capsule 1 capsule, Daily    pantoprazole (PROTONIX) 40 mg, Daily    Testosterone 1.62 % gel Apply  topically Daily. ONCE DAILY    traZODone (DESYREL) 150 mg, Nightly    vitamin C (ASCORBIC ACID) 1,000 mg, Daily      PAST SURGICAL HISTORY  Past Surgical History:   Procedure Laterality Date    CARDIAC ELECTROPHYSIOLOGY PROCEDURE N/A 12/2/2024    Procedure: Ablation atrial fibrillation with PFA with carto; CTA prior; hold eliquis morning of procedure; no other meds to hold;  Surgeon: Thomas George MD;  Location: [facility] INVASIVE LOCATION;  Service: Cardiovascular;  Laterality: N/A;    CYSTECTOMY      BREAST AREA    HAND SURGERY      SMALL INTESTINE SURGERY      TONSILLECTOMY         FAMILY HISTORY  Family History   Problem Relation Age of Onset    Hypertension Mother     Cancer Father     Heart disease Sister     Heart disease Brother     Heart disease Maternal Grandmother     Heart disease Maternal Grandfather     Cancer Paternal Grandmother     Cancer Paternal Grandfather     Heart disease Maternal Aunt        SOCIAL HISTORY  Social History     Socioeconomic History    Marital status:    Tobacco Use    Smoking status: Never     Passive exposure: Never    Smokeless tobacco: Never   Vaping Use    Vaping status: Never Used    Passive vaping exposure: Yes   Substance and Sexual Activity    Alcohol use: Yes     Comment: socially    Drug use: Never    Sexual activity: Defer     ALLERGIES  Patient has no known allergies.    REVIEW OF SYSTEMS  All systems reviewed and negative except for those discussed in HPI.     PHYSICAL EXAM  ED Triage Vitals [01/09/25 1619]   Temp Heart Rate Resp BP SpO2   97.5 °F  (36.4 °C) (!) 127 20 (!) 146/126 (!) 86 %      Temp src Heart Rate Source Patient Position BP Location FiO2 (%)   Oral Monitor Sitting Left arm --     I have reviewed the triage vital signs and nursing notes.    General: Alert.  Nontoxic appearance.  No acute distress.  Head: Normocephalic.  Atraumatic.  Eyes: Pupils 2 mm.  PERRLA.  EOMI.  No scleral icterus.  Cardiovascular: Regular rate and rhythm.  No murmurs.  No rubs.  2+ distal pulses bilaterally.  Respiratory: Equal breath sounds bilaterally.  No rales.  No rhonchi.  No wheezing.  GI: Abdomen is soft.  Nondistended.  Nontender to palpation.  No rebound.  No guarding.  No CVA tenderness.  MSK: No muscle atrophy.  Neurologic: Oriented x 3.  Mild dysarthria and aphasia, otherwise cranial nerves II-XII intact.  Strength 5/5 bilateral upper and lower extremities.  Sensation to touch grossly intact bilaterally.  No focal deficits.  No pronator drift.  + Ataxia left upper extremity.  Normal gait.  Skin: No erythema. No edema.  Psych: Normal mood and affect.     LAB RESULTS  Recent Results (from the past 24 hours)   Ammonia    Collection Time: 01/09/25  5:47 PM    Specimen: Blood   Result Value Ref Range    Ammonia 18 16 - 60 umol/L   High Sensitivity Troponin T    Collection Time: 01/09/25  5:47 PM    Specimen: Blood   Result Value Ref Range    HS Troponin T 10 <22 ng/L   Urinalysis With Microscopic If Indicated (No Culture) - Urine, Clean Catch    Collection Time: 01/09/25  5:51 PM    Specimen: Urine, Clean Catch   Result Value Ref Range    Color, UA Yellow Yellow, Straw    Appearance, UA Clear Clear    pH, UA 5.5 5.0 - 8.0    Specific Gravity, UA 1.023 1.005 - 1.030    Glucose, UA Negative Negative    Ketones, UA Negative Negative    Bilirubin, UA Negative Negative    Blood, UA Negative Negative    Protein, UA Negative Negative    Leuk Esterase, UA Negative Negative    Nitrite, UA Negative Negative    Urobilinogen, UA 0.2 E.U./dL 0.2 - 1.0 E.U./dL   High  Sensitivity Troponin T 1Hr    Collection Time: 01/09/25  6:53 PM    Specimen: Blood   Result Value Ref Range    HS Troponin T 11 <22 ng/L    Troponin T Numeric Delta 1 Abnormal if >/=3 ng/L   Basic Metabolic Panel    Collection Time: 01/10/25  5:24 AM    Specimen: Blood   Result Value Ref Range    Glucose 120 (H) 65 - 99 mg/dL    BUN 12 6 - 20 mg/dL    Creatinine 0.78 0.76 - 1.27 mg/dL    Sodium 146 (H) 136 - 145 mmol/L    Potassium 4.1 3.5 - 5.2 mmol/L    Chloride 110 (H) 98 - 107 mmol/L    CO2 26.4 22.0 - 29.0 mmol/L    Calcium 9.3 8.6 - 10.5 mg/dL    BUN/Creatinine Ratio 15.4 7.0 - 25.0    Anion Gap 9.6 5.0 - 15.0 mmol/L    eGFR 105.3 >60.0 mL/min/1.73   CBC Auto Differential    Collection Time: 01/10/25  5:24 AM    Specimen: Blood   Result Value Ref Range    WBC 4.70 3.40 - 10.80 10*3/mm3    RBC 3.92 (L) 4.14 - 5.80 10*6/mm3    Hemoglobin 12.9 (L) 13.0 - 17.7 g/dL    Hematocrit 38.0 37.5 - 51.0 %    MCV 96.9 79.0 - 97.0 fL    MCH 32.9 26.6 - 33.0 pg    MCHC 33.9 31.5 - 35.7 g/dL    RDW 12.8 12.3 - 15.4 %    RDW-SD 45.3 37.0 - 54.0 fl    MPV 10.0 6.0 - 12.0 fL    Platelets 157 140 - 450 10*3/mm3    Neutrophil % 37.9 (L) 42.7 - 76.0 %    Lymphocyte % 50.4 (H) 19.6 - 45.3 %    Monocyte % 8.3 5.0 - 12.0 %    Eosinophil % 2.8 0.3 - 6.2 %    Basophil % 0.4 0.0 - 1.5 %    Immature Grans % 0.2 0.0 - 0.5 %    Neutrophils, Absolute 1.78 1.70 - 7.00 10*3/mm3    Lymphocytes, Absolute 2.37 0.70 - 3.10 10*3/mm3    Monocytes, Absolute 0.39 0.10 - 0.90 10*3/mm3    Eosinophils, Absolute 0.13 0.00 - 0.40 10*3/mm3    Basophils, Absolute 0.02 0.00 - 0.20 10*3/mm3    Immature Grans, Absolute 0.01 0.00 - 0.05 10*3/mm3    nRBC 0.0 0.0 - 0.2 /100 WBC   POC Glucose Once    Collection Time: 01/10/25  5:51 AM    Specimen: Blood   Result Value Ref Range    Glucose 211 (H) 70 - 130 mg/dL   Adult Transthoracic Echo Complete W/ Cont if Necessary Per Protocol (With Agitated Saline)    Collection Time: 01/10/25 10:46 AM   Result Value Ref Range     EF(MOD-bp) 59.2 %    EF_3D-VOL 64.0 %    LVIDd 4.9 cm    LVIDs 2.8 cm    IVSd 0.94 cm    LVPWd 1.15 cm    FS 43.5 %    IVS/LVPW 0.82 cm    ESV(cubed) 20.8 ml    LV Sys Vol (BSA corrected) 14.6 cm2    EDV(cubed) 115.5 ml    LV Metcalf Vol (BSA corrected) 39.0 cm2    LV mass(C)d 185.0 grams    LVOT area 4.2 cm2    LVOT diam 2.31 cm    EDV(MOD-sp2) 95.2 ml    EDV(MOD-sp4) 89.4 ml    ESV(MOD-sp2) 41.8 ml    ESV(MOD-sp4) 33.6 ml    SV(MOD-sp2) 53.4 ml    SV(MOD-sp4) 55.8 ml    SVi(MOD-SP2) 23.3 ml/m2    SVi(MOD-SP4) 24.3 ml/m2    SVi (LVOT) 36.4 ml/m2    EF(MOD-sp2) 56.1 %    EF(MOD-sp4) 62.4 %    MV E max adi 68.6 cm/sec    MV A max adi 44.6 cm/sec    MV dec time 0.20 sec    MV E/A 1.54     LA ESV Index (BP) 24.8 ml/m2    Med Peak E' Adi 9.0 cm/sec    Lat Peak E' Adi 14.3 cm/sec    Avg E/e' ratio 5.89     SV(LVOT) 83.4 ml    RV Base 4.2 cm    TAPSE (>1.6) 2.42 cm    RV S' 17.2 cm/sec    LA dimension (2D)  3.9 cm    LV V1 max 107.0 cm/sec    LV V1 max PG 4.6 mmHg    LV V1 mean PG 2.00 mmHg    LV V1 VTI 19.9 cm    Ao pk adi 118.0 cm/sec    Ao max PG 5.6 mmHg    Ao mean PG 3.0 mmHg    Ao V2 VTI 21.8 cm    DENTON(I,D) 3.8 cm2    MV max PG 2.26 mmHg    MV mean PG 1.00 mmHg    MV V2 VTI 20.8 cm    MVA(VTI) 4.0 cm2    PA V2 max 144.0 cm/sec    PA acc time 0.06 sec     CV ECHO SHUNT ASSESSMENT PERFORMED (HIDDEN SCRIPTING) 1    POC Glucose 4x Daily Before Meals & at Bedtime    Collection Time: 01/10/25 11:34 AM    Specimen: Blood   Result Value Ref Range    Glucose 92 70 - 130 mg/dL   POC Glucose Once    Collection Time: 01/10/25  4:34 PM    Specimen: Blood   Result Value Ref Range    Glucose 172 (H) 70 - 130 mg/dL       RADIOLOGY  XR Chest 1 View    Result Date: 1/10/2025  PROCEDURE: XR CHEST 1 VW-  HISTORY: Acute Stroke Protocol (onset < 12 hrs), slurred speech  COMPARISON: 12/4/2024  FINDINGS:  Low lung volumes are noted.  The lungs are grossly clear.   There is no evidence of effusion or other pleural disease.  The  mediastinum has a normal appearance.  The cardiac silhouette is unremarkable.      Hypoinflation.   This report was signed and finalized on 1/10/2025 1:29 PM by Terrell Sales MD.      Adult Transthoracic Echo Complete W/ Cont if Necessary Per Protocol (With Agitated Saline)    Result Date: 1/10/2025  1.  Normal left ventricular size and systolic function, 3D EF 64%. 2.  Normal LV diastolic filling pattern. 3.  Normal right ventricular size and systolic function. 4.  Normal left atrial volume index. 5.  Mild tricuspid regurgitation. 6.  Negative bubble study.     MRI Brain Without Contrast    Result Date: 1/9/2025  FINAL REPORT TECHNIQUE: null CLINICAL HISTORY: Seizure, stroke like sx pt reports he fell asleep around 0730 on this day with sharp head pain, woke up around 1330 with slurred speech and difficulty ambulating. THIN DWI SEQ OF BRAIN STEM IN AXIAL AND COR SENT. COMPARISON: null FINDINGS: Noncontrast MRI of the brain. Technique: Sagittal T1, coronal T2, axial T1, T2, FLAIR and diffusion-weighted imaging. Comparison: CT - CT ANGIOGRAM CHEST - 1/9/25 16:41 EST Findings: Normal signal on diffusion-weighted imaging. No evidence for acute ischemic event. Mild nonspecific periventricular and deep white matter disease. This most often can be ascribed to chronic small vessel ischemic change. There are no intracranial masses. No evidence for hemorrhage. The ventricles are normal size, no hydrocephalus. Contents of the posterior fossa including brainstem and cerebellum are unremarkable. Normal vascular flow voids. Normal aeration of paranasal sinuses.     Impression: Mild nonspecific periventricular and deep white matter disease. This most often can be ascribed to chronic small vessel ischemic change. Authenticated and Electronically Signed by Terrell Galo MD on 01/09/2025 06:39:32 PM     PROCEDURES  Procedures    RISK STRATIFICATION  Interval: baseline  1a. Level of Consciousness: 0-->Alert, keenly responsive  1b.  LOC Questions: 0-->Answers both questions correctly  1c. LOC Commands: 0-->Performs both tasks correctly  2. Best Gaze: 0-->Normal  3. Visual: 1-->Partial hemianopia  4. Facial Palsy: 0-->Normal symmetrical movements  5a. Motor Arm, Left: 0-->No drift, limb holds 90 (or 45) degrees for full 10 secs  5b. Motor Arm, Right: 0-->No drift, limb holds 90 (or 45) degrees for full 10 secs  6a. Motor Leg, Left: 1-->Drift, leg falls by the end of the 5-sec period but does not hit bed  6b. Motor Leg, Right: 0-->No drift, leg holds 30 degree position for full 5 secs  7. Limb Ataxia: 2-->Present in two limbs  8. Sensory: 1-->Mild-to-moderate sensory loss, patient feels pinprick is less sharp or is dull on the affected side, or there is a loss of superficial pain with pinprick, but patient is aware of being touched  9. Best Language: 1-->Mild-to-moderate aphasia, some obvious loss of fluency or facility of comprehension, without significant limitation on ideas expressed or form of expression. Reduction of speech and/or comprehension, however, makes conversation. . . (see row details)  10. Dysarthria: 1-->Mild-to-moderate dysarthria, patient slurs at least some words and, at worst, can be understood with some difficulty  11. Extinction and Inattention (formerly Neglect): 0-->No abnormality    Total (NIH Stroke Scale): 7    MEDICAL DECISION MAKING  54 y.o. male with past medical history listed above who presents with slurred speech, headache.    Stroke alert activated in triage.  Last known normal 9.5 hours prior to arrival.  Vital signs remarkable for hypertension, /126. Pulse ox documented 86% on room air in triage, but repeat was 96% on room air.  NIH stroke scale 3 listed above.  Patient outside window and not candidate for thrombolytic therapy, but remains within window for potential endovascular intervention.  Patient taken to CT scanner.    Based on clinical presentation and physical exam, differential diagnosis  includes, but is not limited to, acute ischemic versus hemorrhagic stroke, large vessel occlusion, intracranial structural versus vascular normality, seizure. Doubt aortic dissection.    At least 3 different tests have been ordered on this patient.    Thrombolytic eligibility checklist utilized.  Patient is not candidate for thrombolytic therapy.    Thrombolytics exclusion: > 4.5 hours, factor Xa inhibitor use.    Please see ED course below for my interpretation of the ED workup.    ED Course as of 01/10/25 1702   Thu Jan 09, 2025   1638 ECG 12 Lead Stroke Evaluation  EKG per my interpretation normal sinus rhythm, rate 63, normal axis, no ST segment elevation or depression, normal T waves, normal QRS QTC intervals.   [JS]   1638 Case discussed with Dr. Lopez (tele-neurology) who agrees to see the patient as a consult. [JS]   1650 CT Head Without Contrast Stroke Protocol  I have independently reviewed and interpreted the CT head.  My interpretation is negative for intracranial hemorrhage or mass effect.   [JS]   1759 I reviewed the labs listed above. Clinically unremarkable.    Notable findings are highlighted below.    Old laboratory data was reviewed from the medical records and compared to today's results.   [JS]   1759 CBC & Differential(!) [JS]   1759 WBC: 4.95 [JS]   1759 Hemoglobin: 13.8 [JS]   1759 Platelets: 163 [JS]   1759 Comprehensive Metabolic Panel(!) [JS]   1759 Glucose(!): 100 [JS]   1759 INR(!): 1.16 [JS]   1759 Valproic Acid: 82.9 [JS]   1759 Hemoglobin A1C(!): 5.70 [JS]   1759 HS Troponin T: 10 [JS]   1800 I have independently reviewed and interpreted the imaging listed above.  My interpretations are listed below:    -CTA head and neck negative for large vessel occlusion or aneurysm.    -CTA chest negative for aortic dissection.    Radiologist notes the following: Prior granulomatous disease. Mild disease bilateral carotid bulbs. [JS]      ED Course User Index  [JS] Zion Orourke DO      Patient  seen by Dr. Lopez (neurology). Recommends medical admission for further workup including MRI, EEG, etc.    On re-evaluation, patient resting comfortably.  Vital signs remained stable on room air. Will proceed with medical admission for further workup and management.  I discussed the findings of the ED workup with the patient including my recommendation for admission.  Patient agreeable with plan and disposition.    Case discussed with Dr. Kerley (hospitalist) who agrees to evaluate and admit the patient.  We discussed the HPI, pertinent PMHx, ED course and workup.    Chronic conditions affecting care: Seizure disorder, a-fib    Social determinants of health impacting treatment or disposition: None    REPEAT VITAL SIGNS  AS OF 17:02 EST VITALS:  BP - 145/91  HR - 77  TEMP - 97.6 °F (36.4 °C) (Oral)  O2 SATS - 97%    DIAGNOSIS  Final diagnoses:   Slurred speech     DISPOSITION  ED Disposition       ED Disposition   Decision to Admit    Condition   --    Comment   Level of Care: Telemetry [5]   Diagnosis: Slurred speech [[id number]]   Admitting Physician: KERLEY, BRIAN JOSEPH [[id number]]   Attending Physician: KERLEY, BRIAN JOSEPH [[id number]]                 Please note that portions of this document were completed with voice recognition software.        Zion Orourke DO  01/10/25 3975

## 2025-01-09 NOTE — CONSULTS
[facility]   Teleneurology Note    Patient Name: Sheng Dominguez  : 1970  MRN: [id number]  Primary Care Physician: Tracey Adam APRN  Referring Site: [place]  Location of Neurologist: [place]    Subjective   Teleneurology Initial Data           Neurologist Evaluation Date: 25 Neurologist Evaluation Time: 1708   Date Last Known Well: 25 Time Last Known Well: 0700     History     54 year old man coming with slurred speech. LKW 7 AM. Hx of A.fib and on eliquis, asthma, HTN, HLD, seizures and on depakote and keppra. He is also confused. He had similar symptoms with seizures/postictal state in the past.    Stroke Risk Factors/ Pertinent Data     Stroke risk factors: hypertension, dyslipidemia, other (specify) (A.fib)  Anticoagulants prior to arrival: apixaban (Eliquis)  Antiplatelets prior to arrival: aspirin  Statins prior to arrival: atorvastatin (Lipitor)     Scoring Scales     Modified Cl[place] Scale  Modified [place] Scale: 0 - No Symptoms at all.    NIH Stroke Scale           Interval: baseline  1a. Level of Consciousness: 0-->Alert, keenly responsive  1b. LOC Questions: 0-->Answers both questions correctly  1c. LOC Commands: 0-->Performs both tasks correctly  2. Best Gaze: 0-->Normal  3. Visual: 0-->No visual loss  4. Facial Palsy: 0-->Normal symmetrical movements  5a. Motor Arm, Left: 0-->No drift, limb holds 90 (or 45) degrees for full 10 secs  5b. Motor Arm, Right: 0-->No drift, limb holds 90 (or 45) degrees for full 10 secs  6a. Motor Leg, Left: 0-->No drift, leg holds 30 degree position for full 5 secs  6b. Motor Leg, Right: 0-->No drift, leg holds 30 degree position for full 5 secs  7. Limb Ataxia: 0-->Absent  8. Sensory: 0-->Normal, no sensory loss  9. Best Language: 2- aphasia.  10. Dysarthria: 1-->Mild-to-moderate dysarthria, patient slurs at least some words and, at worst, can be understood with some difficulty  11. Extinction and Inattention (formerly Neglect): 0-->No  abnormality  Total (NIH Stroke Scale): 3    Review of Systems     Review of Systems  10+ systems were reviewed.  In addition to what is listed in the HPI, the following was positive:     Constitutional: No fevers or chills.  Psychiatric: No depression/anxiety. Any stressful events  Skin: No rashes.  Respiratory: No shortness of breath.  Cardiovascular: No chest pain.  GI: No nausea/vomiting.  : No incontinence.  Endocrine: No polydipsia.  Musculoskeletal: No muscle pain/joint pain.  Neurologic: as per HPI and otherwise negative  Hematologic: No excessive bruising.  Objective   Exam     Exam performed with the help of support staff from the referring site  Neurological Exam  General: Alert, cooperative, no distress, appears stated age  Head: normocephalic, without obvious abnormalities, atraumatic  Eyes: conjunctivae/corneas clear  Throat: lips, mucosa, and tongue normal  Lungs: non labored breathing  Extremities: No edema, no cyanosis, no deformities  psych: judgement and insight is appropriate, see neuro exam for mental status.      Neurological Examination:    Mental status: intermittent aphasic.  Cranial nerves:  visual fields were full to confrontation; pupils were equal and reactive to light; versions were full without nystagmus; facial sensation was full; eye closure symmetric and smile was symmetric; handles own secretions, shoulder shrug was symmetric; tongue protruded midline.  Motor:  no pronator drift; power was grossly full throughout  Sensory:  pinprick and light touch full and symmetric;  Coordination:  no ataxia with finger to nose or heel to shin testing.  Result Review    Results     CT head and CTA head and neck were okay    Thrombolytic   Thrombolytics: thrombolytic not given  Thrombolytic Exclusion Criteria: Onset unknown or GREATER than 4.5 hours, Current use of direct thrombin inhibitors or direct factor Xa inhibitors in the past 48 hours     Assessment & Plan   Assessment/ Plan      Assessment:    Possible stroke - dysphasia vs post ictal state vs encephalopathy.    Plan:    Depakote and Keppra level, Ammonia level.  Continue eliquis and ASA for now  Routine EEG  MRI brain wo contrast: brain stem thin section.  ECHO, A1C, lipid panel, UA, CXR, TSH, vit b12, folate.  PT/OT/ST  Continue PTA depakote and keppra.   Further plan after the MRI.    Disposition         I, Robert Lopez MD, saw the patient on 01/09/25 at 1708 for an initial in-patient or emergency room telememedicine face to face consult using interactive technology for Telemedicine The location of the patient was [place]. I was located at [place].    Robert Lopez MD

## 2025-01-09 NOTE — H&P
[facility]      Name:  Sheng Dominguez   Age:  54 y.o.  Sex:  male  :  1970  MRN:  [id number]   Visit Number:  [id number]  Admission Date:  2025  Date Of Service:  25  Primary Care Physician:  Tracey Adam APRN    Chief Complaint:     Slurred speech    History Of Presenting Illness:      Sheng Dominguez is a 54-year-old man with past medical history of Atrial fibrillation on Eliquis, seizure disorder on Depakote and Keppra, asthma, hypertension, hyperlipidemia, anxiety, GERD.  Presented to [facility] ED on 2025 with concern for slurred speech onset 1 PM when woken up from a nap.  Last known well was around 7 AM day of presentation.  Associated with headache, dizziness, difficulty talking.  Denied fevers or chills, chest pain, shortness of air, neck pain, abdominal pain.    ED summary: Afebrile, tachycardic, hypertensive, ED physician reports other vital signs stable on room air.  EKG sinus rhythm.  Troponin not elevated.  Blood glucose 100.  A1c 5.7%.  TSH within normal limits.  Panel within normal limits.  Ammonia not elevated.  INR 1.16.  CBC unremarkable.  Urinalysis unremarkable.  CT head unremarkable.  CT cerebral perfusion no evidence of acute large vessel occlusion.  CT angiogram head and neck mild disease bilateral carotid bulbs, normal CTA of the brain.  CT angiogram chest prior granulomatous disease.  MRI brain performed, results pending.  ED consulted neurology for stroke evaluation.  Outside of window for TNK.    Review Of Systems:    All systems were reviewed and negative except as mentioned in history of presenting illness, assessment and plan.    Past Medical History: Patient  has a past medical history of Anxiety, Asthma, Atrial fibrillation, GERD (gastroesophageal reflux disease), Hearing loss, Hyperlipidemia, Hypertension, Low back pain, and Muscle pain.    Past Surgical History: Patient  has a past surgical history that includes  Small intestine surgery; Hand surgery; Tonsillectomy; Cystectomy; and Cardiac electrophysiology procedure (N/A, 12/2/2024).    Social History: Patient  reports that he has never smoked. He has never been exposed to tobacco smoke. He has never used smokeless tobacco. He reports current alcohol use. He reports that he does not use drugs.    Family History:  Patient's family history has been reviewed and found to be noncontributory.     Allergies:      Patient has no known allergies.    Home Medications:    Prior to Admission Medications       Prescriptions Last Dose Informant Patient Reported? Taking?    albuterol sulfate  (90 Base) MCG/ACT inhaler   No No    Inhale 2 puffs Every 4 (Four) Hours As Needed for Wheezing or Shortness of Air.    ALPRAZolam (XANAX) 1 MG tablet  Self Yes No    Take 1 tablet by mouth 3 (Three) Times a Day.    apixaban (ELIQUIS) 5 MG tablet tablet   No No    Take 1 tablet by mouth Every 12 (Twelve) Hours.    aspirin 81 MG EC tablet  Self No No    Take 1 tablet by mouth Daily.    atorvastatin (LIPITOR) 20 MG tablet  Self No No    Take 1 tablet by mouth Every Night.    cetirizine (zyrTEC) 5 MG tablet  Self Yes No    Take 1 tablet by mouth Daily As Needed for Allergies.    coenzyme Q10 50 MG capsule capsule  Self Yes No    Take 1 capsule by mouth Daily.    divalproex (DEPAKOTE) 500 MG DR tablet  Self Yes No    Take 2 tablets by mouth 2 (Two) Times a Day.    fluticasone (FLONASE) 50 MCG/ACT nasal spray   Yes No    Fluticasone-Salmeterol (ADVAIR/WIXELA) 250-50 MCG/ACT DISKUS   Yes No    gabapentin (NEURONTIN) 600 MG tablet  Self Yes No    Take 1 tablet by mouth 3 (Three) Times a Day.    hyoscyamine (ANASPAZ,LEVSIN) 0.125 MG tablet   Yes No    Take 1 tablet by mouth Every 6 (Six) Hours As Needed for Cramping or Diarrhea.    ipratropium (ATROVENT) 0.06 % nasal spray   Yes No    levETIRAcetam (KEPPRA) 1000 MG tablet  Self Yes No    Take 1 tablet by mouth 2 (Two) Times a Day. TAKES 1.5 TABLETS  "AT NIGHT    Magnesium Oxide -Mg Supplement 400 (240 Mg) MG tablet  Self Yes No    Take 1 tablet by mouth Daily.    metoprolol tartrate (LOPRESSOR) 25 MG tablet  Self Yes No    Take 1 tablet by mouth 2 (Two) Times a Day.    montelukast (SINGULAIR) 10 MG tablet   Yes No    Omega-3 1000 MG capsule  Self Yes No    Take 1 capsule by mouth Daily.    pantoprazole (PROTONIX) 40 MG EC tablet  Self Yes No    Take 1 tablet by mouth Daily.    Testosterone 1.62 % gel   Yes No    Apply  topically Daily. ONCE DAILY    traZODone (DESYREL) 300 MG tablet  Self Yes No    Take 0.5 tablets by mouth Every Night.    vitamin C (ASCORBIC ACID) 250 MG tablet  Self Yes No    Take 4 tablets by mouth Daily.          ED Medications:    Medications   sodium chloride 0.9 % flush 10 mL (has no administration in time range)   iopamidol (ISOVUE-300) 61 % injection 100 mL (100 mL Intravenous Given 1/9/25 1645)   iopamidol (ISOVUE-300) 61 % injection 100 mL (100 mL Intravenous Given 1/9/25 1659)     Vital Signs:  Temp:  [97.5 °F (36.4 °C)] 97.5 °F (36.4 °C)  Heart Rate:  [127] 127  Resp:  [20] 20  BP: (146)/(126) 146/126        01/09/25  1619   Weight: 109 kg (240 lb)     Body mass index is 32.55 kg/m².    Physical Exam:     Most recent vital Signs: BP (!) 146/126 (BP Location: Left arm, Patient Position: Sitting)   Pulse (!) 127   Temp 97.5 °F (36.4 °C) (Oral)   Resp 20   Ht 182.9 cm (72\")   Wt 109 kg (240 lb)   SpO2 (!) 86%   BMI 32.55 kg/m²     Physical Exam  Constitutional:       General: He is not in acute distress.     Appearance: He is ill-appearing. He is not toxic-appearing.   Eyes:      Extraocular Movements: Extraocular movements intact.   Cardiovascular:      Rate and Rhythm: Normal rate and regular rhythm.      Pulses: Normal pulses.      Heart sounds: Normal heart sounds.   Pulmonary:      Effort: Pulmonary effort is normal.      Breath sounds: Normal breath sounds.   Abdominal:      Palpations: Abdomen is soft.      Tenderness: " There is no abdominal tenderness.   Musculoskeletal:      Right lower leg: No edema.      Left lower leg: No edema.   Skin:     General: Skin is warm.   Neurological:      General: No focal deficit present.      Mental Status: He is alert.      Comments: Oriented to self and place   Psychiatric:         Mood and Affect: Mood normal.         Laboratory data:    I have reviewed the labs done in the emergency room.    Results from last 7 days   Lab Units 01/09/25  1631   SODIUM mmol/L 142   POTASSIUM mmol/L 4.3   CHLORIDE mmol/L 106   CO2 mmol/L 26.5   BUN mg/dL 14   CREATININE mg/dL 1.02   CALCIUM mg/dL 9.6   BILIRUBIN mg/dL 0.3   ALK PHOS U/L 56   ALT (SGPT) U/L 19   AST (SGOT) U/L 23   GLUCOSE mg/dL 100*     Results from last 7 days   Lab Units 01/09/25  1631   WBC 10*3/mm3 4.95   HEMOGLOBIN g/dL 13.8   HEMATOCRIT % 41.0   PLATELETS 10*3/mm3 163     Results from last 7 days   Lab Units 01/09/25  1631   INR  1.16*     Results from last 7 days   Lab Units 01/09/25  1747   HSTROP T ng/L 10                     Results from last 7 days   Lab Units 01/09/25  1751   COLOR UA  Yellow   GLUCOSE UA  Negative   KETONES UA  Negative   BLOOD UA  Negative   LEUKOCYTES UA  Negative   PH, URINE  5.5   BILIRUBIN UA  Negative   UROBILINOGEN UA  0.2 E.U./dL       Pain Management Panel           No data to display                    Radiology:    CT Angiogram Chest    Result Date: 1/9/2025  FINAL REPORT TECHNIQUE: null CLINICAL HISTORY: Hypoxia COMPARISON: null FINDINGS: CTA of the chest after administration of IV contrast. Technique: Axial CT images from the lung apices through the adrenal glands after administration of IV contrast. 3D postprocessing and/or MIPS were included. Comparison: None Findings: Patient motion artifact limits evaluation of the lower lobe pulmonary arteries. No central or large pulmonary embolus. Normal thoracic aorta. No aneurysm or dissection. No mediastinal or axillary adenopathy. Granuloma right lower lobe.  No pulmonary nodules. No areas of consolidation. No pneumothorax. Normal bones. Normal limited upper abdomen.     Impression: Prior granulomatous disease. Authenticated and Electronically Signed by Terrell Galo MD on 01/09/2025 05:48:21 PM    CT Angiogram Neck    Result Date: 1/9/2025  FINAL REPORT TECHNIQUE: null CLINICAL HISTORY: Stroke, follow up slurred speech, left hand no control COMPARISON: null FINDINGS: CTA of the neck after administration of IV contrast. Technique: Axial CT images of the neck. Sagittal and coronal reconstruction images provided. 3D postprocessing and/or MIPS were included. Comparison: None Findings: Right common carotid artery: Normal, no significant stenosis. Right internal carotid artery: Mild disease right carotid bulb, no significant stenosis. Left common carotid artery: Normal, no significant stenosis. Left internal carotid artery: Mild disease left carotid bulb, no significant stenosis. Normal bilateral vertebral arteries. Incidental findings: The parotid, submandibular and thyroid glands are normal. There is no evidence for mass. Normal airway. No masses. No signs of infection. The epiglottis is unremarkable. No adenopathy within the neck. Normal aeration of the paranasal sinuses. Normal orbits and globes. The skull base is unremarkable. The lung apices are normal. No significant findings within the spine.     Impression: Mild disease bilateral carotid bulbs. Authenticated and Electronically Signed by Terrell Galo MD on 01/09/2025 05:11:26 PM    CT Angiogram Head w AI Analysis of LVO    Result Date: 1/9/2025  FINAL REPORT TECHNIQUE: null CLINICAL HISTORY: Stroke, follow up slurred speech, left hand no control COMPARISON: null FINDINGS: CTA of the brain. Technique: Axial CTA images of the brain. Sagittal and coronal reconstructions provided. 3D postprocessing and/or MIPS were included. Comparison: None Findings: Normal anterior circulation. This includes the intracranial portion  of the internal carotid arteries, anterior cerebral arteries and middle cerebral arteries. No evidence for aneurysm or occlusion. Normal posterior circulation. This includes the vertebral arteries, basilar artery and posterior cerebral arteries. No evidence for aneurysm or occlusion.     Impression: Normal CTA of the brain. Authenticated and Electronically Signed by Terrell Galo MD on 01/09/2025 05:08:22 PM    CT CEREBRAL PERFUSION WITH & WITHOUT CONTRAST    Result Date: 1/9/2025  CT cerebral perfusion, without and with contrast  HISTORY: Symptoms of CVA. Slurred speech.  TECHNIQUE: CT brain perfusion with mapping of flow parameters including transit time, cerebral blood flow and cerebral blood volume. IV contrast was utilized.  FINDINGS:  Tissue with delayed blood, Tmax > 6 secs:  0 ml  Tissue with significantly reduced blood, rCBF < 30%:  0        1.  No evidence of acute large vessel occlusion    This report was signed and finalized on 1/9/2025 4:49 PM by Terrell Sales MD.      CT Head Without Contrast Stroke Protocol    Result Date: 1/9/2025  PROCEDURE: CT HEAD WO CONTRAST STROKE PROTOCOL-  HISTORY: Neuro deficit, acute, stroke suspected, slurred speech  TECHNIQUE: Noncontrast exam  FINDINGS: Brain parenchyma is homogeneous without evidence of hemorrhage, mass effect or edema. No extra-axial abnormality is noted. Ventricles and cisterns appear normal.  The visualized sinuses, orbits and petrous temporal bones appear unremarkable.      Unremarkable unenhanced CT of the brain.   This study was performed with techniques to keep radiation doses as low as reasonably achievable (ALARA). Individualized dose reduction techniques using automated exposure control or adjustment of vA and/or kV according to the patient size were employed.    This report was signed and finalized on 1/9/2025 4:42 PM by Terrell Sales MD.       Assessment/Plan:    Observation general floor admission 1/9/2025 with slurred speech, stroke  evaluation.    Slurred speech  Neurology consultation, recommendations appreciated.  DDx: Stroke eval, dysphasia, seizures with postictal state, encephalopathy  Depakote and Keppra level, ammonia level.  Continue Eliquis and ASA.  EEG.  MRI brain mild nonspecific periventricular deep white matter disease, often described chronic small vessel ischemic change.  Echo with bubble study, A1c, lipid panel, UA, CXR, TSH, vitamin B12, folate.  Continue home Depakote and Keppra.  PT/OT/SLP.    Chronic: Atrial fibrillation on Eliquis, seizure disorder on Depakote and Keppra, asthma, hypertension, hyperlipidemia, anxiety, GERD  Continue home medications.    Risk Assessment: High  DVT Prophylaxis: Continue home Eliquis  Code Status: Full code  Diet: Cardiac    Advance Care Planning   ACP discussion was held with the patient during this visit. Patient does not have an advance directive, information provided.           Brian Joseph Kerley, DO  01/09/25  18:35 EST    Dictated utilizing Dragon dictation.

## 2025-01-10 ENCOUNTER — APPOINTMENT (OUTPATIENT)
Dept: CARDIOLOGY | Facility: HOSPITAL | Age: 55
End: 2025-01-10
Payer: COMMERCIAL

## 2025-01-10 LAB
ANION GAP SERPL CALCULATED.3IONS-SCNC: 9.6 MMOL/L (ref 5–15)
AV MEAN PRESS GRAD SYS DOP V1V2: 3 MMHG
AV VMAX SYS DOP: 118 CM/SEC
BASOPHILS # BLD AUTO: 0.02 10*3/MM3 (ref 0–0.2)
BASOPHILS NFR BLD AUTO: 0.4 % (ref 0–1.5)
BH CV ECHO MEAS - AO MAX PG: 5.6 MMHG
BH CV ECHO MEAS - AO V2 VTI: 21.8 CM
BH CV ECHO MEAS - AVA(I,D): 3.8 CM2
BH CV ECHO MEAS - EDV(CUBED): 115.5 ML
BH CV ECHO MEAS - EDV(MOD-SP2): 95.2 ML
BH CV ECHO MEAS - EDV(MOD-SP4): 89.4 ML
BH CV ECHO MEAS - EF(MOD-SP2): 56.1 %
BH CV ECHO MEAS - EF(MOD-SP4): 62.4 %
BH CV ECHO MEAS - ESV(CUBED): 20.8 ML
BH CV ECHO MEAS - ESV(MOD-SP2): 41.8 ML
BH CV ECHO MEAS - ESV(MOD-SP4): 33.6 ML
BH CV ECHO MEAS - FS: 43.5 %
BH CV ECHO MEAS - IVS/LVPW: 0.82 CM
BH CV ECHO MEAS - IVSD: 0.94 CM
BH CV ECHO MEAS - LA DIMENSION: 3.9 CM
BH CV ECHO MEAS - LAT PEAK E' VEL: 14.3 CM/SEC
BH CV ECHO MEAS - LV DIASTOLIC VOL/BSA (35-75): 39 CM2
BH CV ECHO MEAS - LV MASS(C)D: 185 GRAMS
BH CV ECHO MEAS - LV MAX PG: 4.6 MMHG
BH CV ECHO MEAS - LV MEAN PG: 2 MMHG
BH CV ECHO MEAS - LV SYSTOLIC VOL/BSA (12-30): 14.6 CM2
BH CV ECHO MEAS - LV V1 MAX: 107 CM/SEC
BH CV ECHO MEAS - LV V1 VTI: 19.9 CM
BH CV ECHO MEAS - LVIDD: 4.9 CM
BH CV ECHO MEAS - LVIDS: 2.8 CM
BH CV ECHO MEAS - LVOT AREA: 4.2 CM2
BH CV ECHO MEAS - LVOT DIAM: 2.31 CM
BH CV ECHO MEAS - LVPWD: 1.15 CM
BH CV ECHO MEAS - MED PEAK E' VEL: 9 CM/SEC
BH CV ECHO MEAS - MV A MAX VEL: 44.6 CM/SEC
BH CV ECHO MEAS - MV DEC TIME: 0.2 SEC
BH CV ECHO MEAS - MV E MAX VEL: 68.6 CM/SEC
BH CV ECHO MEAS - MV E/A: 1.54
BH CV ECHO MEAS - MV MAX PG: 2.26 MMHG
BH CV ECHO MEAS - MV MEAN PG: 1 MMHG
BH CV ECHO MEAS - MV V2 VTI: 20.8 CM
BH CV ECHO MEAS - MVA(VTI): 4 CM2
BH CV ECHO MEAS - PA ACC TIME: 0.06 SEC
BH CV ECHO MEAS - PA V2 MAX: 144 CM/SEC
BH CV ECHO MEAS - SV(LVOT): 83.4 ML
BH CV ECHO MEAS - SV(MOD-SP2): 53.4 ML
BH CV ECHO MEAS - SV(MOD-SP4): 55.8 ML
BH CV ECHO MEAS - SVI(LVOT): 36.4 ML/M2
BH CV ECHO MEAS - SVI(MOD-SP2): 23.3 ML/M2
BH CV ECHO MEAS - SVI(MOD-SP4): 24.3 ML/M2
BH CV ECHO MEAS - TAPSE (>1.6): 2.42 CM
BH CV ECHO MEASUREMENTS AVERAGE E/E' RATIO: 5.89
BH CV ECHO SHUNT ASSESSMENT PERFORMED (HIDDEN SCRIPTING): 1
BH CV XLRA - RV BASE: 4.2 CM
BH CV XLRA - TDI S': 17.2 CM/SEC
BUN SERPL-MCNC: 12 MG/DL (ref 6–20)
BUN/CREAT SERPL: 15.4 (ref 7–25)
CALCIUM SPEC-SCNC: 9.3 MG/DL (ref 8.6–10.5)
CHLORIDE SERPL-SCNC: 110 MMOL/L (ref 98–107)
CO2 SERPL-SCNC: 26.4 MMOL/L (ref 22–29)
CREAT SERPL-MCNC: 0.78 MG/DL (ref 0.76–1.27)
DEPRECATED RDW RBC AUTO: 45.3 FL (ref 37–54)
EGFRCR SERPLBLD CKD-EPI 2021: 105.3 ML/MIN/1.73
EOSINOPHIL # BLD AUTO: 0.13 10*3/MM3 (ref 0–0.4)
EOSINOPHIL NFR BLD AUTO: 2.8 % (ref 0.3–6.2)
ERYTHROCYTE [DISTWIDTH] IN BLOOD BY AUTOMATED COUNT: 12.8 % (ref 12.3–15.4)
GLUCOSE BLDC GLUCOMTR-MCNC: 111 MG/DL (ref 70–130)
GLUCOSE BLDC GLUCOMTR-MCNC: 172 MG/DL (ref 70–130)
GLUCOSE BLDC GLUCOMTR-MCNC: 211 MG/DL (ref 70–130)
GLUCOSE BLDC GLUCOMTR-MCNC: 92 MG/DL (ref 70–130)
GLUCOSE SERPL-MCNC: 120 MG/DL (ref 65–99)
HCT VFR BLD AUTO: 38 % (ref 37.5–51)
HGB BLD-MCNC: 12.9 G/DL (ref 13–17.7)
IMM GRANULOCYTES # BLD AUTO: 0.01 10*3/MM3 (ref 0–0.05)
IMM GRANULOCYTES NFR BLD AUTO: 0.2 % (ref 0–0.5)
LEFT ATRIUM VOLUME INDEX: 24.8 ML/M2
LV EF 3D SEGMENTATION: 64 %
LV EF BIPLANE MOD: 59.2 %
LYMPHOCYTES # BLD AUTO: 2.37 10*3/MM3 (ref 0.7–3.1)
LYMPHOCYTES NFR BLD AUTO: 50.4 % (ref 19.6–45.3)
MCH RBC QN AUTO: 32.9 PG (ref 26.6–33)
MCHC RBC AUTO-ENTMCNC: 33.9 G/DL (ref 31.5–35.7)
MCV RBC AUTO: 96.9 FL (ref 79–97)
MONOCYTES # BLD AUTO: 0.39 10*3/MM3 (ref 0.1–0.9)
MONOCYTES NFR BLD AUTO: 8.3 % (ref 5–12)
NEUTROPHILS NFR BLD AUTO: 1.78 10*3/MM3 (ref 1.7–7)
NEUTROPHILS NFR BLD AUTO: 37.9 % (ref 42.7–76)
NRBC BLD AUTO-RTO: 0 /100 WBC (ref 0–0.2)
PLATELET # BLD AUTO: 157 10*3/MM3 (ref 140–450)
PMV BLD AUTO: 10 FL (ref 6–12)
POTASSIUM SERPL-SCNC: 4.1 MMOL/L (ref 3.5–5.2)
RBC # BLD AUTO: 3.92 10*6/MM3 (ref 4.14–5.8)
SODIUM SERPL-SCNC: 146 MMOL/L (ref 136–145)
WBC NRBC COR # BLD AUTO: 4.7 10*3/MM3 (ref 3.4–10.8)

## 2025-01-10 PROCEDURE — 94640 AIRWAY INHALATION TREATMENT: CPT

## 2025-01-10 PROCEDURE — G0378 HOSPITAL OBSERVATION PER HR: HCPCS

## 2025-01-10 PROCEDURE — 94799 UNLISTED PULMONARY SVC/PX: CPT

## 2025-01-10 PROCEDURE — 63710000001 INSULIN LISPRO (HUMAN) PER 5 UNITS: Performed by: INTERNAL MEDICINE

## 2025-01-10 PROCEDURE — 82948 REAGENT STRIP/BLOOD GLUCOSE: CPT

## 2025-01-10 PROCEDURE — 97162 PT EVAL MOD COMPLEX 30 MIN: CPT

## 2025-01-10 PROCEDURE — 94761 N-INVAS EAR/PLS OXIMETRY MLT: CPT

## 2025-01-10 PROCEDURE — 99214 OFFICE O/P EST MOD 30 MIN: CPT | Performed by: STUDENT IN AN ORGANIZED HEALTH CARE EDUCATION/TRAINING PROGRAM

## 2025-01-10 PROCEDURE — 93306 TTE W/DOPPLER COMPLETE: CPT | Performed by: INTERNAL MEDICINE

## 2025-01-10 PROCEDURE — 63710000001 INSULIN GLARGINE PER 5 UNITS: Performed by: INTERNAL MEDICINE

## 2025-01-10 PROCEDURE — 80048 BASIC METABOLIC PNL TOTAL CA: CPT | Performed by: STUDENT IN AN ORGANIZED HEALTH CARE EDUCATION/TRAINING PROGRAM

## 2025-01-10 PROCEDURE — 93306 TTE W/DOPPLER COMPLETE: CPT

## 2025-01-10 PROCEDURE — 97166 OT EVAL MOD COMPLEX 45 MIN: CPT

## 2025-01-10 PROCEDURE — 99232 SBSQ HOSP IP/OBS MODERATE 35: CPT | Performed by: INTERNAL MEDICINE

## 2025-01-10 PROCEDURE — 82948 REAGENT STRIP/BLOOD GLUCOSE: CPT | Performed by: INTERNAL MEDICINE

## 2025-01-10 PROCEDURE — 85025 COMPLETE CBC W/AUTO DIFF WBC: CPT | Performed by: STUDENT IN AN ORGANIZED HEALTH CARE EDUCATION/TRAINING PROGRAM

## 2025-01-10 PROCEDURE — 92610 EVALUATE SWALLOWING FUNCTION: CPT

## 2025-01-10 RX ORDER — IBUPROFEN 600 MG/1
1 TABLET ORAL
Status: DISCONTINUED | OUTPATIENT
Start: 2025-01-10 | End: 2025-01-11 | Stop reason: HOSPADM

## 2025-01-10 RX ORDER — ACETAMINOPHEN 325 MG/1
650 TABLET ORAL EVERY 6 HOURS PRN
Status: DISCONTINUED | OUTPATIENT
Start: 2025-01-10 | End: 2025-01-11 | Stop reason: HOSPADM

## 2025-01-10 RX ORDER — DEXTROSE MONOHYDRATE 25 G/50ML
10-50 INJECTION, SOLUTION INTRAVENOUS
Status: DISCONTINUED | OUTPATIENT
Start: 2025-01-10 | End: 2025-01-11 | Stop reason: HOSPADM

## 2025-01-10 RX ORDER — ACETAMINOPHEN 650 MG/1
650 SUPPOSITORY RECTAL EVERY 4 HOURS PRN
Status: DISCONTINUED | OUTPATIENT
Start: 2025-01-10 | End: 2025-01-11 | Stop reason: HOSPADM

## 2025-01-10 RX ORDER — DULOXETIN HYDROCHLORIDE 30 MG/1
30 CAPSULE, DELAYED RELEASE ORAL DAILY
Status: DISCONTINUED | OUTPATIENT
Start: 2025-01-10 | End: 2025-01-11 | Stop reason: HOSPADM

## 2025-01-10 RX ORDER — LORAZEPAM 2 MG/ML
1 INJECTION INTRAMUSCULAR EVERY 4 HOURS PRN
Status: DISCONTINUED | OUTPATIENT
Start: 2025-01-10 | End: 2025-01-11 | Stop reason: HOSPADM

## 2025-01-10 RX ORDER — NICOTINE POLACRILEX 4 MG
15 LOZENGE BUCCAL
Status: DISCONTINUED | OUTPATIENT
Start: 2025-01-10 | End: 2025-01-11 | Stop reason: HOSPADM

## 2025-01-10 RX ORDER — INSULIN LISPRO 100 [IU]/ML
1-200 INJECTION, SOLUTION INTRAVENOUS; SUBCUTANEOUS AS NEEDED
Status: DISCONTINUED | OUTPATIENT
Start: 2025-01-10 | End: 2025-01-11 | Stop reason: HOSPADM

## 2025-01-10 RX ORDER — INSULIN LISPRO 100 [IU]/ML
1-200 INJECTION, SOLUTION INTRAVENOUS; SUBCUTANEOUS
Status: DISCONTINUED | OUTPATIENT
Start: 2025-01-10 | End: 2025-01-11 | Stop reason: HOSPADM

## 2025-01-10 RX ADMIN — CETIRIZINE HYDROCHLORIDE 5 MG: 10 TABLET, FILM COATED ORAL at 21:51

## 2025-01-10 RX ADMIN — ACETAMINOPHEN 650 MG: 325 TABLET, FILM COATED ORAL at 12:28

## 2025-01-10 RX ADMIN — BUDESONIDE AND FORMOTEROL FUMARATE DIHYDRATE 2 PUFF: 160; 4.5 AEROSOL RESPIRATORY (INHALATION) at 06:47

## 2025-01-10 RX ADMIN — Medication 10 ML: at 21:37

## 2025-01-10 RX ADMIN — APIXABAN 5 MG: 5 TABLET, FILM COATED ORAL at 09:27

## 2025-01-10 RX ADMIN — LEVETIRACETAM 1000 MG: 500 TABLET, FILM COATED ORAL at 09:27

## 2025-01-10 RX ADMIN — DIVALPROEX SODIUM 1000 MG: 500 TABLET, DELAYED RELEASE ORAL at 21:39

## 2025-01-10 RX ADMIN — METOPROLOL TARTRATE 25 MG: 25 TABLET, FILM COATED ORAL at 21:39

## 2025-01-10 RX ADMIN — INSULIN GLARGINE 27 UNITS: 100 INJECTION, SOLUTION SUBCUTANEOUS at 21:51

## 2025-01-10 RX ADMIN — MONTELUKAST 10 MG: 10 TABLET, FILM COATED ORAL at 21:40

## 2025-01-10 RX ADMIN — TRAZODONE HYDROCHLORIDE 150 MG: 50 TABLET ORAL at 21:40

## 2025-01-10 RX ADMIN — LEVETIRACETAM 1000 MG: 500 TABLET, FILM COATED ORAL at 21:39

## 2025-01-10 RX ADMIN — GABAPENTIN 600 MG: 300 CAPSULE ORAL at 07:34

## 2025-01-10 RX ADMIN — BUDESONIDE AND FORMOTEROL FUMARATE DIHYDRATE 2 PUFF: 160; 4.5 AEROSOL RESPIRATORY (INHALATION) at 18:55

## 2025-01-10 RX ADMIN — INSULIN LISPRO 3 UNITS: 100 INJECTION, SOLUTION INTRAVENOUS; SUBCUTANEOUS at 09:27

## 2025-01-10 RX ADMIN — APIXABAN 5 MG: 5 TABLET, FILM COATED ORAL at 21:40

## 2025-01-10 RX ADMIN — PANTOPRAZOLE SODIUM 40 MG: 40 TABLET, DELAYED RELEASE ORAL at 09:27

## 2025-01-10 RX ADMIN — ATORVASTATIN CALCIUM 20 MG: 20 TABLET, FILM COATED ORAL at 21:39

## 2025-01-10 RX ADMIN — DULOXETINE HYDROCHLORIDE 30 MG: 30 CAPSULE, DELAYED RELEASE ORAL at 12:28

## 2025-01-10 RX ADMIN — METOPROLOL TARTRATE 25 MG: 25 TABLET, FILM COATED ORAL at 09:27

## 2025-01-10 RX ADMIN — ALPRAZOLAM 1 MG: 0.5 TABLET ORAL at 09:27

## 2025-01-10 RX ADMIN — DIVALPROEX SODIUM 1000 MG: 500 TABLET, DELAYED RELEASE ORAL at 09:27

## 2025-01-10 RX ADMIN — ALPRAZOLAM 1 MG: 0.5 TABLET ORAL at 21:40

## 2025-01-10 RX ADMIN — ALPRAZOLAM 1 MG: 0.5 TABLET ORAL at 17:23

## 2025-01-10 RX ADMIN — ASPIRIN 81 MG: 81 TABLET, COATED ORAL at 09:27

## 2025-01-10 RX ADMIN — ATORVASTATIN CALCIUM 80 MG: 80 TABLET, FILM COATED ORAL at 21:39

## 2025-01-10 NOTE — PLAN OF CARE
Goal Outcome Evaluation:              Outcome Evaluation: Bedside eval of swallow completed with pt. seated upright in bed with wife present. No communication deficits including dysarthria at this time with pt. reporting resolved speech symptoms, but seizure activity may affect at times if seizures recur. Pt. denied dysphagia. He was given trials of regular solids, puree, and thin liquids. Oral phase was WFL. No overt s/s aspiration or other pharyngeal phase dysphagia exhibited with any consistency or texture trialed. Pt. has a dx of GERD but no complaints at this time. Recommend: 1. continue regular diet with thin liq as [place], 2. meds whole with thin liq as [place], 3. aspiration precautions, 4. reflux precautions, 5. monitor swallowing and communication if ongoing seizure activity. D/W pt. and wife following eval with verbalized understanding.    Anticipated Discharge Disposition (SLP): unknown          SLP Swallowing Diagnosis: functional oral phase, functional pharyngeal phase, esophageal dysphagia (01/10/25 1140)

## 2025-01-10 NOTE — THERAPY EVALUATION
Patient Name: Sheng Dominguez  : 1970    MRN: [id number]                              Today's Date: 1/10/2025       Admit Date: 2025    Visit Dx:     ICD-10-CM ICD-9-CM   1. Slurred speech  R47.81 784.59     Patient Active Problem List   Diagnosis    New onset a-fib    Slurred speech     Past Medical History:   Diagnosis Date    Anxiety     Asthma     Atrial fibrillation     GERD (gastroesophageal reflux disease)     Hearing loss     Hyperlipidemia     Hypertension     Low back pain     Muscle pain      Past Surgical History:   Procedure Laterality Date    CARDIAC ELECTROPHYSIOLOGY PROCEDURE N/A 2024    Procedure: Ablation atrial fibrillation with PFA with carto; CTA prior; hold eliquis morning of procedure; no other meds to hold;  Surgeon: Thomas George MD;  Location: [facility] INVASIVE LOCATION;  Service: Cardiovascular;  Laterality: N/A;    CYSTECTOMY      BREAST AREA    HAND SURGERY      SMALL INTESTINE SURGERY      TONSILLECTOMY        General Information       Row Name 01/10/25 1140          Physical Therapy Time and Intention    Document Type evaluation  -     Mode of Treatment physical therapy  -       Row Name 01/10/25 1140          General Information    Patient Profile Reviewed yes  -     Prior Level of Function independent:;ADL's;all household mobility  -     Existing Precautions/Restrictions fall;seizures  -     Barriers to Rehab medically complex;previous functional deficit;cognitive status  -       Row Name 01/10/25 1140          Living Environment    People in Home significant other  -       Row Name 01/10/25 1140          Home Main Entrance    Number of Stairs, Main Entrance three  -     Stair Railings, Main Entrance railings safe and in good condition  -       Row Name 01/10/25 1140          Stairs Within Home, Primary    Number of Stairs, Within Home, Primary none  -       Row Name 01/10/25 1140          Cognition    Orientation Status (Cognition) oriented x 4   -       Row Name 01/10/25 1140          Safety Issues/Impairments Affecting Functional Mobility    Safety Issues Affecting Function (Mobility) ability to follow commands;awareness of need for assistance;insight into deficits/self-awareness;positioning of assistive device;safety precaution awareness;safety precautions follow-through/compliance;sequencing abilities  -     Impairments Affecting Function (Mobility) balance;cognition;endurance/activity tolerance;pain;sensation/sensory awareness;strength  -     Cognitive Impairments, Mobility Safety/Performance awareness, need for assistance;attention;insight into deficits/self-awareness;problem-solving/reasoning;safety precaution awareness;safety precaution follow-through;sequencing abilities  -               User Key  (r) = Recorded By, (t) = Taken By, (c) = Cosigned By      Initials Name Provider Type     Tommy Haq PT Physical Therapist                   Mobility       Row Name 01/10/25 1142          Bed Mobility    Bed Mobility bed mobility (all) activities  -     All Activities, [place] (Bed Mobility) standby assist  -     Assistive Device (Bed Mobility) head of bed elevated;bed rails  -       Row Name 01/10/25 1142          Sit-Stand Transfer    Sit-Stand Allen (Transfers) minimum assist (75% patient effort);verbal cues  -     Assistive Device (Sit-Stand Transfers) walker, front-wheeled  -       Row Name 01/10/25 1142          Gait/Stairs (Locomotion)    Allen Level (Gait) minimum assist (75% patient effort)  -     Assistive Device (Gait) walker, front-wheeled  -     Patient was able to Ambulate yes  -     Distance in Feet (Gait) 18  -     Deviations/Abnormal Patterns (Gait) ataxic;base of support, wide;gait speed decreased;festinating/shuffling;stride length decreased  -     Bilateral Gait Deviations forward flexed posture;heel strike decreased;weight shift ability decreased  -               User Key  (r) =  Recorded By, (t) = Taken By, (c) = Cosigned By      Initials Name Provider Type    Tommy Tidwell PT Physical Therapist                   Obj/Interventions       Row Name 01/10/25 1143          Range of Motion Comprehensive    General Range of Motion no range of motion deficits identified  -[facility] Name 01/10/25 1143          Strength Comprehensive (MMT)    General Manual Muscle Testing (MMT) Assessment lower extremity strength deficits identified  -     Comment, General Manual Muscle Testing (MMT) Assessment B LE grossly 3/5  -[facility] Name 01/10/25 1143          Balance    Balance Assessment sitting static balance;sitting dynamic balance;standing static balance;standing dynamic balance  -     Static Sitting Balance standby assist;verbal cues  -     Dynamic Sitting Balance standby assist;verbal cues  -     Position, Sitting Balance unsupported;sitting edge of bed  -     Static Standing Balance minimal assist;verbal cues  -     Dynamic Standing Balance minimal assist;verbal cues  -     Position/Device Used, Standing Balance walker, front-wheeled  -     Balance Interventions sitting;standing;sit to stand  -               User Key  (r) = Recorded By, (t) = Taken By, (c) = Cosigned By      Initials Name Provider Type    Tommy Tidwell PT Physical Therapist                   Goals/Plan       [facility] Name 01/10/25 1155          Bed Mobility Goal 1 (PT)    Activity/Assistive Device (Bed Mobility Goal 1, PT) bed mobility activities, all  -     [place] Level/Cues Needed (Bed Mobility Goal 1, PT) independent  -     Time Frame (Bed Mobility Goal 1, PT) short term goal (STG);5 days  -     Progress/Outcomes (Bed Mobility Goal 1, PT) new goal  -       Row Name 01/10/25 1155          Transfer Goal 1 (PT)    Activity/Assistive Device (Transfer Goal 1, PT) sit-to-stand/stand-to-sit  -     [place] Level/Cues Needed (Transfer Goal 1, PT) contact guard required  -     Time Frame  (Transfer Goal 1, PT) long term goal (LTG);10 days  -MK     Progress/Outcome (Transfer Goal 1, PT) new goal  -       Row Name 01/10/25 1155          Gait Training Goal 1 (PT)    Activity/Assistive Device (Gait Training Goal 1, PT) gait (walking locomotion)  -     [place] Level (Gait Training Goal 1, PT) contact guard required  -MK     Distance (Gait Training Goal 1, PT) 50 ft  -MK     Time Frame (Gait Training Goal 1, PT) long term goal (LTG);10 days  -MK     Progress/Outcome (Gait Training Goal 1, PT) new goal  -       Row Name 01/10/25 1151          Patient Education Goal (PT)    Activity (Patient Education Goal, PT) Pt to participate in B LE ther ex at least 3x per week  -     [place]/Cues/Accuracy (Memory Goal 2, PT) demonstrates adequately  -MK     Time Frame (Patient Education Goal, PT) long term goal (LTG);10 days  -MK     Progress/Outcome (Patient Education Goal, PT) new goal  -       Row Name 01/10/25 4883          Therapy Assessment/Plan (PT)    Planned Therapy Interventions (PT) balance training;bed mobility training;gait training;home exercise program;motor coordination training;neuromuscular re-education;patient/family education;strengthening;transfer training  -               User Key  (r) = Recorded By, (t) = Taken By, (c) = Cosigned By      Initials Name Provider Type    Tommy Tidwell, PT Physical Therapist                   Clinical Impression       Row Name 01/10/25 4694          Pain    Pretreatment Pain Rating 7/10  -MK     Posttreatment Pain Rating 7/10  -MK     Pain Location back  -     Pain Side/Orientation right;posterior  -     Pain Management Interventions positioning techniques utilized;exercise or physical activity utilized;activity modification encouraged  -     Response to Pain Interventions activity level improved;activity participation with tolerable pain;mobility function improved  -       Row Name 01/10/25 5328          Plan of Care Review    Plan of  "Care Reviewed With patient  -     Progress no change  -     Outcome Evaluation Pt participated well in PT evaluation this date. Pt reports that he lives in a [facility] with 3 NICOL. pt lives with his wife, but is previously IND per report. Pt demonstrated increased confusion this date and struggled to follow directions/ sequence his movements. Pt has no AD at home. Prior to treatment, pt reports that he has 7/10 back pain and has numbness down both of his legs. Pt reported decreased sensation on his L LE when PT administered sensation testing. Pt was able to discriminate location of touch on LEs. Pt came from supine to sitting SBA with cues needed often. Pt completed STS min A with cues, reporting same amount of numbness. Pt ambulates 18 ft  min A with cues using RW. Pt shuffles his feet and demonstrates increased tremoring. Pt returned to his bed where he was placed in the chair position to alleviate some pain. PT discussed STR with pt who seemed to be resistant to \"going to a nursing home\". Pt educated on benefits of STR and difference between wings in nursing homes. Pt is expected to benefit from skilled PT during this inpatient stay to maxmize functional mobility and IND. Pt would further benefit from STR upon dc.  -       Row Name 01/10/25 1144          Therapy Assessment/Plan (PT)    Patient/Family Therapy Goals Statement (PT) pt would like to return home with signficant other  -     Rehab Potential (PT) good  -     Criteria for Skilled Interventions Met (PT) yes;skilled treatment is necessary  -     Therapy Frequency (PT) 5 times/wk  -     Predicted Duration of Therapy Intervention (PT) 2 weeks  -       Row Name 01/10/25 1144          Vital Signs    O2 Delivery Pre Treatment room air  -     O2 Delivery Intra Treatment room air  -MK     O2 Delivery Post Treatment room air  -MK     Pre Patient Position Supine  -MK     Intra Patient Position Standing  -MK     Post Patient Position Sitting  -       " Row Name 01/10/25 1144          Positioning and Restraints    Pre-Treatment Position in bed  -MK     Post Treatment Position bed  -MK     In Bed notified nsg;sitting;call light within reach;encouraged to call for assist;with family/caregiver;with SLP  -               User Key  (r) = Recorded By, (t) = Taken By, (c) = Cosigned By      Initials Name Provider Type    Tommy Tidwell PT Physical Therapist                   Outcome Measures       Row Name 01/10/25 1156          How much help from another person do you currently need...    Turning from your back to your side while in flat bed without using bedrails? 4  -MK     Moving from lying on back to sitting on the side of a flat bed without bedrails? 3  -MK     Moving to and from a bed to a chair (including a wheelchair)? 3  -MK     Standing up from a chair using your arms (e.g., wheelchair, bedside chair)? 3  -MK     Climbing 3-5 steps with a railing? 2  -MK     To walk in hospital room? 2  -MK     AM-PAC 6 Clicks Score (PT) 17  -MK     Highest Level of Mobility Goal 5 --> Static standing  -MK       Row Name 01/10/25 1156          Functional Assessment    Outcome Measure Options AM-PAC 6 Clicks Basic Mobility (PT)  -               User Key  (r) = Recorded By, (t) = Taken By, (c) = Cosigned By      Initials Name Provider Type    Tommy Tidwell, PT Physical Therapist                                 Physical Therapy Education       Title: PT OT SLP Therapies (In Progress)       Topic: Physical Therapy (In Progress)       Point: Mobility training (Done)       Learning Progress Summary            Patient Acceptance, E,D, DU,VU by  at 1/10/2025 1157                      Point: Home exercise program (Not Started)       Learner Progress:  Not documented in this visit.              Point: Body mechanics (Done)       Learning Progress Summary            Patient Acceptance, E,D, DU,VU by  at 1/10/2025 1157                      Point: Precautions (Not Started)   "     Learner Progress:  Not documented in this visit.                              User Key       Initials Effective Dates Name Provider Type Discipline     06/13/23 -  Tommy Haq PT Physical Therapist PT                  PT Recommendation and Plan  Planned Therapy Interventions (PT): balance training, bed mobility training, gait training, home exercise program, motor coordination training, neuromuscular re-education, patient/family education, strengthening, transfer training  Progress: no change  Outcome Evaluation: Pt participated well in PT evaluation this date. Pt reports that he lives in a [facility] with 3 NICOL. pt lives with his wife, but is previously IND per report. Pt demonstrated increased confusion this date and struggled to follow directions/ sequence his movements. Pt has no AD at home. Prior to treatment, pt reports that he has 7/10 back pain and has numbness down both of his legs. Pt reported decreased sensation on his L LE when PT administered sensation testing. Pt was able to discriminate location of touch on LEs. Pt came from supine to sitting SBA with cues needed often. Pt completed STS min A with cues, reporting same amount of numbness. Pt ambulates 18 ft  min A with cues using RW. Pt shuffles his feet and demonstrates increased tremoring. Pt returned to his bed where he was placed in the chair position to alleviate some pain. PT discussed STR with pt who seemed to be resistant to \"going to a nursing home\". Pt educated on benefits of STR and difference between wings in nursing homes. Pt is expected to benefit from skilled PT during this inpatient stay to maxmize functional mobility and IND. Pt would further benefit from STR upon dc.     Time Calculation:   PT Evaluation Complexity  History, PT Evaluation Complexity: 3 or more personal factors and/or comorbidities  Examination of Body Systems (PT Eval Complexity): total of 4 or more elements  Clinical Presentation (PT Evaluation Complexity): " evolving  Clinical Decision Making (PT Evaluation Complexity): moderate complexity  Overall Complexity (PT Evaluation Complexity): moderate complexity     PT Charges       Row Name 01/10/25 0919             Time Calculation    Start Time 0919  -MK      PT Received On 01/10/25  -MK      PT Goal Re-Cert Due Date 01/20/25  -MK         Untimed Charges    PT Eval/Re-eval Minutes 72  -MK         Total Minutes    Untimed Charges Total Minutes 72  -MK       Total Minutes 72  -MK                User Key  (r) = Recorded By, (t) = Taken By, (c) = Cosigned By      Initials Name Provider Type    Tommy Tidwell, PT Physical Therapist                  Therapy Charges for Today       Code Description Service Date Service Provider Modifiers Qty    [id number] HC-PT EVAL MOD COMPLEXITY 5 1/10/2025 Tommy Haq, PT  1            PT G-Codes  Outcome Measure Options: AM-PAC 6 Clicks Basic Mobility (PT)  AM-PAC 6 Clicks Score (PT): 17  Modified Aundrea Scale: 0 - No Symptoms at all.  PT Discharge Summary  Anticipated Discharge Disposition (PT): inpatient rehabilitation facility    Tommy Haq PT  1/10/2025

## 2025-01-10 NOTE — THERAPY EVALUATION
Acute Care - Speech Language Pathology   Swallow Initial Evaluation  [place]     Patient Name: Sheng Dominguez  : 1970  MRN: [id number]  Today's Date: 1/10/2025               Admit Date: 2025    Visit Dx:     ICD-10-CM ICD-9-CM   1. Slurred speech  R47.81 784.59     Patient Active Problem List   Diagnosis    New onset a-fib    Slurred speech     Past Medical History:   Diagnosis Date    Anxiety     Asthma     Atrial fibrillation     GERD (gastroesophageal reflux disease)     Hearing loss     Hyperlipidemia     Hypertension     Low back pain     Muscle pain      Past Surgical History:   Procedure Laterality Date    CARDIAC ELECTROPHYSIOLOGY PROCEDURE N/A 2024    Procedure: Ablation atrial fibrillation with PFA with carto; CTA prior; hold eliquis morning of procedure; no other meds to hold;  Surgeon: Thomas George MD;  Location: [facility] INVASIVE LOCATION;  Service: Cardiovascular;  Laterality: N/A;    CYSTECTOMY      BREAST AREA    HAND SURGERY      SMALL INTESTINE SURGERY      TONSILLECTOMY         SLP Recommendation and Plan  SLP Swallowing Diagnosis: functional oral phase, functional pharyngeal phase, esophageal dysphagia (01/10/25 1140)  SLP Diet Recommendation: regular textures, thin liquids (01/10/25 1140)  Recommended Precautions and Strategies: upright posture during/after eating, general aspiration precautions, reflux precautions (01/10/25 114)  SLP Rec. for Method of Medication Administration: meds whole, with thin liquids, as tolerated (01/10/25 114)     Monitor for Signs of Aspiration: notify SLP if any concerns (01/10/25 1140)  Recommended Diagnostics: No further SLP services recommended (01/10/25 1140)  Swallow Criteria for Skilled Therapeutic Interventions Met: no problems identified which require skilled intervention (01/10/25 1140)  Anticipated Discharge Disposition (SLP): unknown (01/10/25 114)     Therapy Frequency (Swallow): evaluation only (01/10/25 114)     Oral Care  Recommendations: Toothbrush (01/10/25 1140)     Swallowing Considerations per Physician Discretion: medical management of suspected esophageal dysphagia, as indicated (01/10/25 1140)                                  Outcome Evaluation: Bedside eval of swallow completed with pt. seated upright in bed with wife present. No communication deficits including dysarthria at this time with pt. reporting resolved speech symptoms, but seizure activity may affect at times if seizures recur. Pt. denied dysphagia. He was given trials of regular solids, puree, and thin liquids. Oral phase was WFL. No overt s/s aspiration or other pharyngeal phase dysphagia exhibited with any consistency or texture trialed. Pt. has a dx of GERD but no complaints at this time. Recommend: 1. continue regular diet with thin liq as leland, 2. meds whole with thin liq as leland, 3. aspiration precautions, 4. reflux precautions, 5. monitor swallowing and communication if ongoing seizure activity. D/W pt. and wife following eval with verbalized understanding.      SWALLOW EVALUATION (Last 72 Hours)       SLP Adult Swallow Evaluation       Row Name 01/10/25 1140                   Rehab Evaluation    Subjective Information complains of  concerns with overall health  -TM        Patient Observations alert;cooperative  -TM        Patient/Family/Caregiver Comments/Observations wife present  -TM        Patient Effort good  -TM        Symptoms Noted During/After Treatment none  -TM           General Information    Patient Profile Reviewed yes  -TM        Pertinent History Of Current Problem seizures, cardiac, GERD  -TM        Current Method of Nutrition regular textures;thin liquids  -TM        Precautions/Limitations, Vision WFL  -TM        Precautions/Limitations, Hearing WFL  -TM        Prior Level of Function-Communication WFL  -TM        Prior Level of Function-Swallowing no diet consistency restrictions  -TM        Plans/Goals Discussed with patient and family   -TM        Barriers to Rehab medically complex  -TM        Patient's Goals for Discharge return to all previous roles/activities  -TM           Pain    Pretreatment Pain Rating 0/10 - no pain  -TM        Posttreatment Pain Rating 0/10 - no pain  -TM           Oral Motor Structure and Function    Oral Lesions or Structural Abnormalities and/or variants none identified  -TM        Dentition Assessment natural, present and adequate;missing teeth  -TM        Secretion Management WNL/WFL  -TM        Mucosal Quality moist, healthy  -TM        Volitional Cough WFL  -TM           Oral Musculature and Cranial Nerve Assessment    Oral Motor General Assessment WFL  -TM           General Eating/Swallowing Observations    Respiratory Support Currently in Use room air  -TM        Eating/Swallowing Skills fed by SLP;self-fed  -TM        Positioning During Eating upright in bed  -TM        Utensils Used spoon;straw  -TM        Consistencies Trialed regular textures;pureed;thin liquids  -TM        Pre SpO2 (%) 96  -TM        Post SpO2 (%) 96  -TM           Respiratory    Respiratory Status WFL;room air;during swallowing/eating  -TM           Clinical Swallow Eval    Oral Prep Phase WFL  -TM        Oral Transit WFL  -TM        Oral Residue WFL  -TM        Pharyngeal Phase no overt signs/symptoms of pharyngeal impairment  -TM        Esophageal Phase suspected esophageal impairment  dx of GERD  -TM        Clinical Swallow Evaluation Summary Bedside eval of swallow completed with pt. seated upright in bed with wife present.  No communication deficits including dysarthria at this time with pt. reporting resolved speech symptoms, but seizure activity may affect at times if seizures recur.  Pt. denied dysphagia.  He was given trials of regular solids, puree, and thin liquids.  Oral phase was WFL.  No overt s/s aspiration or other pharyngeal phase dysphagia exhibited with any consistency or texture trialed.  Pt. has a dx of GERD but no  complaints at this time. Recommend:  1. continue regular diet with thin liq as leland, 2. meds whole with thin liq as leland, 3. aspiration precautions, 4. reflux precautions, 5. monitor swallowing and communication if ongoing seizure activity. D/W pt. and wife following eval with verbalized understanding.  -TM           Esophageal Phase Concerns    Esophageal Phase Concerns other (see comments)  dx of GERD  -TM           SLP Evaluation Clinical Impression    SLP Swallowing Diagnosis functional oral phase;functional pharyngeal phase;esophageal dysphagia  -        Functional Impact risk of aspiration/pneumonia  pending seizure activity  -        Swallow Criteria for Skilled Therapeutic Interventions Met no problems identified which require skilled intervention  -           Recommendations    Therapy Frequency (Swallow) evaluation only  -        SLP Diet Recommendation regular textures;thin liquids  -TM        Recommended Diagnostics No further SLP services recommended  -        Recommended Precautions and Strategies upright posture during/after eating;general aspiration precautions;reflux precautions  -        Oral Care Recommendations Toothbrush  -        SLP Rec. for Method of Medication Administration meds whole;with thin liquids;as tolerated  -        Monitor for Signs of Aspiration notify SLP if any concerns  -        Anticipated Discharge Disposition (SLP) unknown  -TM        Swallowing Considerations per Physician Discretion medical management of suspected esophageal dysphagia, as indicated  -                  User Key  (r) = Recorded By, (t) = Taken By, (c) = Cosigned By      Initials Name Effective Dates     Gabriela Sebastian 06/16/21 -                     EDUCATION  The patient has been educated in the following areas:   Dysphagia (Swallowing Impairment) Oral Care/Hydration.                Time Calculation:    Time Calculation- SLP       Row Name 01/10/25 1200             Time Calculation-  SLP    SLP Start Time 1005  -TM      SLP Received On 01/10/25  -TM                User Key  (r) = Recorded By, (t) = Taken By, (c) = Cosigned By      Initials Name Provider Type    Gabriela Almanza Speech and Language Pathologist                    Therapy Charges for Today       Code Description Service Date Service Provider Modifiers Qty    [id number] HC ST EVAL ORAL PHARYNG SWALLOW 4 1/10/2025 Gabriela Sebastian GN 1                 Gabriela Sebastian  1/10/2025

## 2025-01-10 NOTE — CASE MANAGEMENT/SOCIAL WORK
Discharge Planning Assessment  [facility]     Patient Name: Sheng Dominguez  MRN: [id number]  Today's Date: 1/10/2025    Admit Date: 1/9/2025    Plan: Home with family   Discharge Needs Assessment       Row Name 01/10/25 0948       Living Environment    Current Living Arrangements home    Potentially Unsafe Housing Conditions none    In the past 12 months has the electric, gas, oil, or water company threatened to shut off services in your home? No    Primary Care Provided by self    Provides Primary Care For no one    Able to Return to Prior Arrangements yes       Resource/Environmental Concerns    Resource/Environmental Concerns none    Transportation Concerns none       Transportation Needs    In the past 12 months, has lack of transportation kept you from medical appointments or from getting medications? no    In the past 12 months, has lack of transportation kept you from meetings, work, or from getting things needed for daily living? No       Food Insecurity    Within the past 12 months, you worried that your food would run out before you got the money to buy more. Never true    Within the past 12 months, the food you bought just didn't last and you didn't have money to get more. Never true       Transition Planning    Patient/Family Anticipates Transition to home with family    Patient/Family Anticipated Services at Transition none    Transportation Anticipated family or friend will provide       Discharge Needs Assessment    Readmission Within the Last 30 Days no previous admission in last 30 days    Equipment Currently Used at Home none    Concerns to be Addressed no discharge needs identified    Anticipated Changes Related to Illness none    Equipment Needed After Discharge none                   Discharge Plan       Row Name 01/10/25 0950       Plan    Plan Home with family    Patient/Family in Agreement with Plan yes    Plan Comments Met with patient at bedside.Verified patient's address, phone number,  contacts, physician and pharmacy. Patient has adequate transportation. Reports no financial or food insecurity. Patient lives with his spouse. He uses [facility] in [place], agreeable to meds to bed. Patient doesn't have DME. His wife provides transportation. He plans to return home once medically ready.                  Continued Care and Services - Admitted Since 1/9/2025    No active coordination exists for this encounter.          Demographic Summary       Row Name 01/10/25 0929       General Information    Admission Type observation    Arrived From emergency department    Referral Source admission list    Reason for Consult discharge planning    Preferred Language English       Contact Information    Permission Granted to Share Info With                    Functional Status       Row Name 01/10/25 0940       Functional Status    Usual Activity Tolerance good    Current Activity Tolerance good       Physical Activity    On average, how many days per week do you engage in moderate to strenuous exercise (like a brisk walk)? 0 days    On average, how many minutes do you engage in exercise at this level? 0 min    Number of minutes of exercise per week 0       Assessment of Health Literacy    How often do you have someone help you read hospital materials? Never    How often do you have problems learning about your medical condition because of difficulty understanding written information? Never    How often do you have a problem understanding what is told to you about your medical condition? Never    How confident are you filling out medical forms by yourself? Extremely    Health Literacy Excellent       Functional Status, IADL    Medications independent    Meal Preparation independent    Housekeeping independent    Laundry independent    Shopping independent                   Psychosocial       Row Name 01/10/25 8056       Values/Beliefs    Spiritual, Cultural Beliefs, [employer] Practices, Values that Affect  Care no       Mental Health    Little interest or pleasure in doing things Not at all    Feeling down, depressed, or hopeless Not at all       Stress    Do you feel stress - tense, restless, nervous, or anxious, or unable to sleep at night because your mind is troubled all the time - these days? Not at all       Coping/Stress    Major Change/Loss/Stressor illness    Patient Personal Strengths able to adapt;resilient    Techniques to [place] with Loss/Stress/Change diversional activities    Reaction to Health Status accepting    Understanding of Condition and Treatment adequate understanding of medical condition;adequate understanding of treatment       Developmental Stage (Eriksson's Stages of Development)    Developmental Stage Stage 7 (35-65 years/Middle Adulthood) Generativity vs. Stagnation                   Abuse/Neglect    No documentation.                  Legal    No documentation.                  Substance Abuse    No documentation.                  Patient Forms    No documentation.                     Yuri Gunter RN

## 2025-01-10 NOTE — PROGRESS NOTES
"DOS: 1/10/2025  NAME: Sheng Dominguez   : 1970  PCP: Tracey Adam APRN  Chief Complaint   Patient presents with    Speech Problem    Difficulty Walking       Chief complaint: Concern for ongoing seizures, staring off spells  Subjective: Patient seen and examined at the bedside via teleneurology video call with the rounding nurse, patient stated that he had couple staring off spells last night, again I have not noticed it during my interview with the patient this morning, I still appreciate asterixis on the upper extremities which usually metabolic in etiology or medication side effect.  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  I discussed with the patient holding gabapentin and see how things going.  I have low suspicion for ongoing status epilepticus or seizures on the patient.  On chart review from his prior neurology notes there was a concern about pseudoseizures.  His Depakote level was therapeutic on this admission at 82.9.    Objective:  Vital signs: /81 (BP Location: Left arm, Patient Position: Lying)   Pulse 77   Temp 97 °F (36.1 °C) (Oral)   Resp 18   Ht 182.9 cm (72.01\")   Wt 108 kg (238 lb 1.6 oz)   SpO2 97%   BMI 32.28 kg/m²    Gen: NAD, vitals reviewed  MS: oriented x3, recent/remote memory intact, normal attention/concentration, language intact, no neglect.  CN: visual acuity grossly normal, PERRL, EOMI, no facial droop, no dysarthria  Motor: Moves all extremities against gravity, normal tone  Abnormal movements: Asterixis on the upper extremities bilaterally more on the left than the right.    Laboratory results:  Lab Results   Component Value Date    GLUCOSE 120 (H) 01/10/2025    CALCIUM 9.3 01/10/2025     (H) 01/10/2025    K 4.1 01/10/2025    CO2 26.4 01/10/2025     (H) 01/10/2025    BUN 12 01/10/2025    CREATININE 0.78 01/10/2025    BCR 15.4 01/10/2025    ANIONGAP 9.6 01/10/2025     Lab Results   Component Value Date "    WBC 4.70 01/10/2025    HGB 12.9 (L) 01/10/2025    HCT 38.0 01/10/2025    MCV 96.9 01/10/2025     01/10/2025     Lab Results   Component Value Date    LDL 78 01/09/2025         Lab 01/09/25  1631   HEMOGLOBIN A1C 5.70*        Review of labs: A1c 5.7, Depakote level 82.9, Keppra level pending    Review and interpretation of imaging: I personally reviewed the MRI brain showed nonspecific periventricular white matter disease otherwise no acute finding.    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  -Polypharmacy on multiple sedative medications including Xanax 1 mg 3 times daily, Depakote 1 g twice daily, Keppra 1 g twice daily, gabapentin 600 mg 3 times daily, trazodone 150 mg at night  -History of severe anxiety  -Peripheral neuropathy      Plan:  1.  Stop gabapentin as it is well-known to cause/worsen myoclonus.  Start Cymbalta 30 mg daily for neuropathy for lower extremity numbness  2.  Continue Keppra and valproic acid at current doses  3.  Continue Xanax for severe anxiety  4.  Stat EEG  5.  Patient will benefit from prolonged EEG monitoring/EMU admission as an outpatient, follow-up with your neurologist for this to be arranged    Seizure Precautions:  ·    Do not drive 90 days. (this is to include: car, bicycle, or motorcycle)  ·    Do not drive operate dangerous equipment such as power tools, heavy machinery with moving parts, or an open flame.  ·    Do not swim alone (this would include a bathtub full of water is a small swimming pool).  ·    Avoid unsecured heights. (this is to include: scaffolding, ladders, trees, and roofs).    This was an audio and video enabled telemedicine encounter.        Management discussed with patient, family,  Dr. Dia via epic chat.

## 2025-01-10 NOTE — THERAPY EVALUATION
Patient Name: Sheng Dominguez  : 1970    MRN: [id number]                              Today's Date: 1/10/2025       Admit Date: 2025    Visit Dx:     ICD-10-CM ICD-9-CM   1. Slurred speech  R47.81 784.59     Patient Active Problem List   Diagnosis    New onset a-fib    Slurred speech     Past Medical History:   Diagnosis Date    Anxiety     Asthma     Atrial fibrillation     GERD (gastroesophageal reflux disease)     Hearing loss     Hyperlipidemia     Hypertension     Low back pain     Muscle pain      Past Surgical History:   Procedure Laterality Date    CARDIAC ELECTROPHYSIOLOGY PROCEDURE N/A 2024    Procedure: Ablation atrial fibrillation with PFA with carto; CTA prior; hold eliquis morning of procedure; no other meds to hold;  Surgeon: Thomas George MD;  Location: [facility] INVASIVE LOCATION;  Service: Cardiovascular;  Laterality: N/A;    CYSTECTOMY      BREAST AREA    HAND SURGERY      SMALL INTESTINE SURGERY      TONSILLECTOMY        General Information       Row Name 01/10/25 1225          OT Time and Intention    Document Type evaluation  -DB     Mode of Treatment occupational therapy  -DB     Patient Effort good  -DB       Row Name 01/10/25 1225          General Information    Patient Profile Reviewed yes  -DB     Prior Level of Function independent:;ADL's  -DB     Existing Precautions/Restrictions fall;seizures  -DB     Barriers to Rehab medically complex;previous functional deficit;cognitive status  -DB       Row Name 01/10/25 1225          Occupational Profile    Reason for Services/Referral (Occupational Profile) ADL decline  -DB       Row Name 01/10/25 1225          Living Environment    People in Home significant other  -DB       Row Name 01/10/25 1225          Home Main Entrance    Number of Stairs, Main Entrance three  -DB     Stair Railings, Main Entrance railings safe and in good condition  -DB       Row Name 01/10/25 1225          Stairs Within Home, Primary    Number of Stairs,  Within Home, Primary none  -DB       Row Name 01/10/25 1225          Cognition    Orientation Status (Cognition) oriented x 4  -DB       Row Name 01/10/25 1225          Safety Issues/Impairments Affecting Functional Mobility    Safety Issues Affecting Function (Mobility) ability to follow commands;awareness of need for assistance;insight into deficits/self-awareness;positioning of assistive device;safety precaution awareness;safety precautions follow-through/compliance;sequencing abilities  -DB     Impairments Affecting Function (Mobility) balance;cognition;endurance/activity tolerance;pain;sensation/sensory awareness;strength  -DB     Cognitive Impairments, Mobility Safety/Performance awareness, need for assistance;attention;insight into deficits/self-awareness;problem-solving/reasoning;safety precaution awareness;safety precaution follow-through;sequencing abilities  -DB               User Key  (r) = Recorded By, (t) = Taken By, (c) = Cosigned By      Initials Name Provider Type    DB Mack Ellis OT Occupational Therapist                     Mobility/ADL's       Row Name 01/10/25 1226          Bed Mobility    Bed Mobility bed mobility (all) activities  -DB     All Activities, [place] (Bed Mobility) standby assist  -DB     Assistive Device (Bed Mobility) head of bed elevated;bed rails  -DB       Row Name 01/10/25 1226          Transfers    Transfers sit-stand transfer  -DB       Row Name 01/10/25 1226          Sit-Stand Transfer    Sit-Stand [place] (Transfers) minimum assist (75% patient effort);verbal cues  -DB     Assistive Device (Sit-Stand Transfers) walker, front-wheeled  -DB       Row Name 01/10/25 1226          Functional Mobility    Functional Mobility- Ind. Level minimum assist (75% patient effort)  -DB     Functional Mobility- Device walker, front-wheeled  -DB     Functional Mobility-Distance (Feet) 18  -DB     Patient was able to Ambulate yes  -DB       Row Name 01/10/25 1226           Activities of Daily Living    BADL Assessment/Intervention bathing;upper body dressing;lower body dressing;grooming;feeding;toileting  -DB       Row Name 01/10/25 1226          Bathing Assessment/Intervention    [place] Level (Bathing) bathing skills;moderate assist (50% patient effort);verbal cues  -DB       Row Name 01/10/25 1226          Upper Body Dressing Assessment/Training    [place] Level (Upper Body Dressing) upper body dressing skills;minimum assist (75% patient effort)  -DB       Row Name 01/10/25 1226          Lower Body Dressing Assessment/Training    [place] Level (Lower Body Dressing) lower body dressing skills;moderate assist (50% patient effort)  -DB       Row Name 01/10/25 1226          Grooming Assessment/Training    [place] Level (Grooming) grooming skills;minimum assist (75% patient effort)  -DB       Row Name 01/10/25 1226          Self-Feeding Assessment/Training    [place] Level (Feeding) feeding skills;set up  -DB       [facility] Name 01/10/25 1226          Toileting Assessment/Training    [place] Level (Toileting) toileting skills;minimum assist (75% patient effort)  -DB               User Key  (r) = Recorded By, (t) = Taken By, (c) = Cosigned By      Initials Name Provider Type    Mack Bullard OT Occupational Therapist                   Obj/Interventions       Row Name 01/10/25 1228          Vision Assessment/Intervention    Visual Impairment/Limitations WFL  -DB       Row Name 01/10/25 1228          Range of Motion Comprehensive    General Range of Motion bilateral upper extremity ROM WFL  -DB       [facility] Name 01/10/25 1228          Strength Comprehensive (MMT)    General Manual Muscle Testing (MMT) Assessment upper extremity strength deficits identified  -DB       [facility] Name 01/10/25 1228          Upper Extremity (Manual Muscle Testing)    Comment, MMT: Upper Extremity BUEs grossly 3+/5  -DB               User Key  (r) = Recorded By, (t) = Taken By,  (c) = Cosigned By      Initials Name Provider Type    DB Mack Ellis, OT Occupational Therapist                   Goals/Plan       Row Name 01/10/25 1237          Bed Mobility Goal 1 (OT)    Activity/Assistive Device (Bed Mobility Goal 1, OT) bed mobility activities, all  -DB     [place] Level/Cues Needed (Bed Mobility Goal 1, OT) modified independence  -DB     Time Frame (Bed Mobility Goal 1, OT) by discharge  -DB     Progress/Outcomes (Bed Mobility Goal 1, OT) goal ongoing  -DB       Row Name 01/10/25 1237          Transfer Goal 1 (OT)    Activity/Assistive Device (Transfer Goal 1, OT) toilet  -DB     [place] Level/Cues Needed (Transfer Goal 1, OT) standby assist  -DB     Time Frame (Transfer Goal 1, OT) by discharge  -DB     Progress/Outcome (Transfer Goal 1, OT) goal ongoing  -DB       Row Name 01/10/25 1237          Toileting Goal 1 (OT)    Activity/Device (Toileting Goal 1, OT) toileting skills, all  -DB     [place] Level/Cues Needed (Toileting Goal 1, OT) contact guard required  -DB     Time Frame (Toileting Goal 1, OT) by discharge  -DB     Progress/Outcome (Toileting Goal 1, OT) goal ongoing  -DB       Row Name 01/10/25 1237          Strength Goal 1 (OT)    Strength Goal 1 (OT) Pt to tolerate resistance band exercises to increase strength and endurance needed for ADL mgmt  -DB     Time Frame (Strength Goal 1, OT) long term goal (LTG)  -DB     Progress/Outcome (Strength Goal 1, OT) goal ongoing  -DB       Row Name 01/10/25 1237          Therapy Assessment/Plan (OT)    Planned Therapy Interventions (OT) activity tolerance training;adaptive equipment training;BADL retraining;functional balance retraining;occupation/activity based interventions;patient/caregiver education/training;transfer/mobility retraining;strengthening exercise;ROM/therapeutic exercise  -DB               User Key  (r) = Recorded By, (t) = Taken By, (c) = Cosigned By      Initials Name Provider Type    DB  Mack Ellis, NICHO Occupational Therapist                   Clinical Impression       Row Name 01/10/25 1229          Pain Assessment    Pretreatment Pain Rating 7/10  -DB     Posttreatment Pain Rating 7/10  -DB     Pain Location back  -DB     Pain Side/Orientation right;posterior  -DB     Pain Management Interventions exercise or physical activity utilized;activity modification encouraged;positioning techniques utilized  -DB     Response to Pain Interventions activity participation with tolerable pain;activity level improved;mobility function improved  -DB       Row Name 01/10/25 1223          Plan of Care Review    Plan of Care Reviewed With patient  -DB     Progress no change  -DB     Outcome Evaluation OT eval completed. Pt supine in bed upon OT arrival. Pt A&O x4. Pts significant other present during evaluation. Pt reports he lives with significant other in a one level home with 3 NICOL. Pt reports he has a hand rail. He reports (I) with ADLs and no use of AD at baseline. Pt able to complete supine to sit EOB with SBA. Pt mod A to don (B) socks. Pt noted to have increased difficulty with following directions and sequencing. Pt noted to have decreased sensation on LLE. Pt able to complete STS with min A and RW. Pt able to complete fxl mobility x18ft with RW and min A. Pt requires verbal cues for directional purposes, safety awareness and positioning of device. Pt able to return supine in bed with min A. Pt left with wife present in room, needs in reach, and SLP. Pt to benefit from skilled OT services to address strengthening, endurance, fxl mobility, transfers, and ADL mgmt. OT recommends STR upon d/c when medically appropriate.  -DB       Row Name 01/10/25 1220          Therapy Assessment/Plan (OT)    Rehab Potential (OT) good  -DB     Criteria for Skilled Therapeutic Interventions Met (OT) yes;skilled treatment is necessary  -DB     Therapy Frequency (OT) 5 times/wk  -DB       Row Name 01/10/25 4743           Therapy Plan Review/Discharge Plan (OT)    Anticipated Discharge Disposition (OT) inpatient rehabilitation facility  -DB       Row Name 01/10/25 1229          Vital Signs    Pre SpO2 (%) 96  -DB     O2 Delivery Pre Treatment room air  -DB     O2 Delivery Intra Treatment room air  -DB     O2 Delivery Post Treatment room air  -DB     Pre Patient Position Supine  -DB     Intra Patient Position Standing  -DB     Post Patient Position Supine  -DB       Row Name 01/10/25 1229          Positioning and Restraints    Pre-Treatment Position in bed  -DB     Post Treatment Position bed  -DB     In Bed notified nsg;call light within reach;encouraged to call for assist;with SLP  -DB               User Key  (r) = Recorded By, (t) = Taken By, (c) = Cosigned By      Initials Name Provider Type    Mack Bullard, [place] Occupational Therapist                   Outcome Measures       Row Name 01/10/25 1237          How much help from another is currently needed...    Putting on and taking off regular lower body clothing? 3  -DB     Bathing (including washing, rinsing, and drying) 3  -DB     Toileting (which includes using toilet bed pan or urinal) 3  -DB     Putting on and taking off regular upper body clothing 3  -DB     Taking care of personal grooming (such as brushing teeth) 3  -DB     Eating meals 3  -DB     AM-PAC 6 Clicks Score (OT) 18  -DB       Row Name 01/10/25 1156          How much help from another person do you currently need...    Turning from your back to your side while in flat bed without using bedrails? 4  -MK     Moving from lying on back to sitting on the side of a flat bed without bedrails? 3  -MK     Moving to and from a bed to a chair (including a wheelchair)? 3  -MK     Standing up from a chair using your arms (e.g., wheelchair, bedside chair)? 3  -MK     Climbing 3-5 steps with a railing? 2  -MK     To walk in hospital room? 2  -MK     AM-PAC 6 Clicks Score (PT) 17  -MK     Highest Level of Mobility  Goal 5 --> Static standing  -       Row Name 01/10/25 1237 01/10/25 1156       Functional Assessment    Outcome Measure Options AM-PAC 6 Clicks Daily Activity (OT)  -DB AM-PAC 6 Clicks Basic Mobility (PT)  -MK              User Key  (r) = Recorded By, (t) = Taken By, (c) = Cosigned By      Initials Name Provider Type    Tommy Tidwell, PT Physical Therapist    Mack Bullard, OT Occupational Therapist                    Occupational Therapy Education       Title: PT OT SLP Therapies (In Progress)       Topic: Occupational Therapy (In Progress)       Point: ADL training (Done)       Description:   Instruct learner(s) on proper safety adaptation and remediation techniques during self care or transfers.   Instruct in proper use of assistive devices.                  Learning Progress Summary            Patient Acceptance, E,TB, VU by  at 1/10/2025 1238    Comment: ADL retraining, transfers                      Point: Home exercise program (Not Started)       Description:   Instruct learner(s) on appropriate technique for monitoring, assisting and/or progressing therapeutic exercises/activities.                  Learner Progress:  Not documented in this visit.              Point: Precautions (Done)       Description:   Instruct learner(s) on prescribed precautions during self-care and functional transfers.                  Learning Progress Summary            Patient Acceptance, E,TB, VU by DB at 1/10/2025 1238    Comment: ADL retraining, transfers                      Point: Body mechanics (Not Started)       Description:   Instruct learner(s) on proper positioning and spine alignment during self-care, functional mobility activities and/or exercises.                  Learner Progress:  Not documented in this visit.                              User Key       Initials Effective Dates Name Provider Type Discipline    DB 07/06/23 -  Mack Ellis OT Occupational Therapist OT                  OT  Recommendation and Plan  Planned Therapy Interventions (OT): activity tolerance training, adaptive equipment training, BADL retraining, functional balance retraining, occupation/activity based interventions, patient/caregiver education/training, transfer/mobility retraining, strengthening exercise, ROM/therapeutic exercise  Therapy Frequency (OT): 5 times/wk  Plan of Care Review  Plan of Care Reviewed With: patient  Progress: no change  Outcome Evaluation: OT eval completed. Pt supine in bed upon OT arrival. Pt A&O x4. Pts significant other present during evaluation. Pt reports he lives with significant other in a one level home with 3 NICOL. Pt reports he has a hand rail. He reports (I) with ADLs and no use of AD at baseline. Pt able to complete supine to sit EOB with SBA. Pt mod A to don (B) socks. Pt noted to have increased difficulty with following directions and sequencing. Pt noted to have decreased sensation on LLE. Pt able to complete STS with min A and RW. Pt able to complete fxl mobility x18ft with RW and min A. Pt requires verbal cues for directional purposes, safety awareness and positioning of device. Pt able to return supine in bed with min A. Pt left with wife present in room, needs in reach, and SLP. Pt to benefit from skilled OT services to address strengthening, endurance, fxl mobility, transfers, and ADL mgmt. OT recommends STR upon d/c when medically appropriate.     Time Calculation:   Evaluation Complexity (OT)  Review Occupational Profile/Medical/Therapy History Complexity: expanded/moderate complexity  Assessment, Occupational Performance/Identification of Deficit Complexity: 3-5 performance deficits  Clinical Decision Making Complexity (OT): detailed assessment/moderate complexity  Overall Complexity of Evaluation (OT): moderate complexity     Time Calculation- OT       Row Name 01/10/25 1238             Time Calculation- OT    OT Start Time 0920  -DB      OT Received On 01/10/25  -DB      OT  Goal Re-Cert Due Date 01/20/25  -DB         Untimed Charges    OT Eval/Re-eval Minutes 68  -DB         Total Minutes    Untimed Charges Total Minutes 68  -DB       Total Minutes 68  -DB                User Key  (r) = Recorded By, (t) = Taken By, (c) = Cosigned By      Initials Name Provider Type    DB Mack Ellis OT Occupational Therapist                  Therapy Charges for Today       Code Description Service Date Service Provider Modifiers Qty    [id number] HC-OT EVAL MOD COMPLEXITY 5 1/10/2025 Mack Ellis OT  1                 Mack Ellis OT  1/10/2025

## 2025-01-10 NOTE — PLAN OF CARE
Goal Outcome Evaluation:  Plan of Care Reviewed With: patient        Progress: no change  Outcome Evaluation: OT eval completed. Pt supine in bed upon OT arrival. Pt A&O x4. Pts significant other present during evaluation. Pt reports he lives with significant other in a one level home with 3 NICOL. Pt reports he has a hand rail. He reports (I) with ADLs and no use of AD at baseline. Pt able to complete supine to sit EOB with SBA. Pt mod A to don (B) socks. Pt noted to have increased difficulty with following directions and sequencing. Pt noted to have decreased sensation on LLE. Pt able to complete STS with min A and RW. Pt able to complete fxl mobility x18ft with RW and min A. Pt requires verbal cues for directional purposes, safety awareness and positioning of device. Pt able to return supine in bed with min A. Pt left with wife present in room, needs in reach, and SLP. Pt to benefit from skilled OT services to address strengthening, endurance, fxl mobility, transfers, and ADL mgmt. OT recommends STR upon d/c when medically appropriate.    Anticipated Discharge Disposition (OT): inpatient rehabilitation facility

## 2025-01-10 NOTE — PROGRESS NOTES
[facility]    Name:  Sheng Dominguez   Age:  54 y.o.  Sex:  male  :  1970  MRN:  [id number]   Visit Number:  [id number]  Admission Date:  2025  Date Of Service:  01/10/25  Primary Care Physician:  Tracey Adam APRN     LOS: 0 days :    Chief Complaint:      Slurred speech    Subjective:    Patient examined today resting comfortably.  Events from overnight reviewed.  Questionable continued seizure activity.  Neurology following.    Hospital Course:    Sheng Dominguez is a 54-year-old man with past medical history of Atrial fibrillation on Eliquis, seizure disorder on Depakote and Keppra, asthma, hypertension, hyperlipidemia, anxiety, GERD.  Presented to [facility] ED on 2025 with concern for slurred speech onset 1 PM when woken up from a nap.  Last known well was around 7 AM day of presentation.  Associated with headache, dizziness, difficulty talking.  Denied fevers or chills, chest pain, shortness of air, neck pain, abdominal pain.     ED summary: Afebrile, tachycardic, hypertensive, ED physician reports other vital signs stable on room air.  EKG sinus rhythm.  Troponin not elevated.  Blood glucose 100.  A1c 5.7%.  TSH within normal limits.  Panel within normal limits.  Ammonia not elevated.  INR 1.16.  CBC unremarkable.  Urinalysis unremarkable.  CT head unremarkable.  CT cerebral perfusion no evidence of acute large vessel occlusion.  CT angiogram head and neck mild disease bilateral carotid bulbs, normal CTA of the brain.  CT angiogram chest prior granulomatous disease.  MRI brain performed, results pending.  ED consulted neurology for stroke evaluation.  Outside of window for TNK.    Review of Systems:     All systems were reviewed and negative except as mentioned in subjective, assessment and plan.    Vital Signs:    Temp:  [97 °F (36.1 °C)-98.3 °F (36.8 °C)] 97.6 °F (36.4 °C)  Heart Rate:  [] 77  Resp:  [18-20] 18  BP: (122-173)/()  "145/91    Intake and output:    No intake/output data recorded.  I/O this shift:  In: 1030 [P.O.:1030]  Out: -     Physical Examination:    General Appearance:  Alert and cooperative.    Head:  Atraumatic and normocephalic.   Eyes: Conjunctivae and sclerae normal, no icterus. No pallor.   Throat: No oral lesions, no thrush, oral mucosa moist.   Neck: Supple, trachea midline, no thyromegaly.   Lungs:   Breath sounds heard bilaterally equally.  No wheezing or crackles. No Pleural rub or bronchial breathing.   Heart:  Normal S1 and S2, no murmur, no gallop, no rub. No JVD.   Abdomen:   Normal bowel sounds, no masses, no organomegaly. Soft, nontender, nondistended, no rebound tenderness.   Extremities: Supple, no edema, no cyanosis, no clubbing.   Skin: No bleeding or rash.   Neurologic: Alert and oriented x 3. No facial asymmetry. Moves all four limbs. No tremors.      Laboratory results:    Results from last 7 days   Lab Units 01/10/25  0524 01/09/25  1631   SODIUM mmol/L 146* 142   POTASSIUM mmol/L 4.1 4.3   CHLORIDE mmol/L 110* 106   CO2 mmol/L 26.4 26.5   BUN mg/dL 12 14   CREATININE mg/dL 0.78 1.02   CALCIUM mg/dL 9.3 9.6   BILIRUBIN mg/dL  --  0.3   ALK PHOS U/L  --  56   ALT (SGPT) U/L  --  19   AST (SGOT) U/L  --  23   GLUCOSE mg/dL 120* 100*     Results from last 7 days   Lab Units 01/10/25  0524 01/09/25  1631   WBC 10*3/mm3 4.70 4.95   HEMOGLOBIN g/dL 12.9* 13.8   HEMATOCRIT % 38.0 41.0   PLATELETS 10*3/mm3 157 163     Results from last 7 days   Lab Units 01/09/25  1631   INR  1.16*     Results from last 7 days   Lab Units 01/09/25  1853 01/09/25  1747   HSTROP T ng/L 11 10         No results for input(s): \"PHART\", \"YCT4BVL\", \"PO2ART\", \"CCS7XJQ\", \"BASEEXCESS\" in the last 8760 hours.   I have reviewed the patient's laboratory results.    Radiology results:    XR Chest 1 View    Result Date: 1/10/2025  PROCEDURE: XR CHEST 1 VW-  HISTORY: Acute Stroke Protocol (onset < 12 hrs), slurred speech  COMPARISON: " 12/4/2024  FINDINGS:  Low lung volumes are noted.  The lungs are grossly clear.   There is no evidence of effusion or other pleural disease.  The mediastinum has a normal appearance.  The cardiac silhouette is unremarkable.      Impression: Hypoinflation.   This report was signed and finalized on 1/10/2025 1:29 PM by Terrell Sales MD.      MRI Brain Without Contrast    Result Date: 1/9/2025  FINAL REPORT TECHNIQUE: null CLINICAL HISTORY: Seizure, stroke like sx pt reports he fell asleep around 0730 on this day with sharp head pain, woke up around 1330 with slurred speech and difficulty ambulating. THIN DWI SEQ OF BRAIN STEM IN AXIAL AND COR SENT. COMPARISON: null FINDINGS: Noncontrast MRI of the brain. Technique: Sagittal T1, coronal T2, axial T1, T2, FLAIR and diffusion-weighted imaging. Comparison: CT - CT ANGIOGRAM CHEST - 1/9/25 16:41 EST Findings: Normal signal on diffusion-weighted imaging. No evidence for acute ischemic event. Mild nonspecific periventricular and deep white matter disease. This most often can be ascribed to chronic small vessel ischemic change. There are no intracranial masses. No evidence for hemorrhage. The ventricles are normal size, no hydrocephalus. Contents of the posterior fossa including brainstem and cerebellum are unremarkable. Normal vascular flow voids. Normal aeration of paranasal sinuses.     Impression: Impression: Mild nonspecific periventricular and deep white matter disease. This most often can be ascribed to chronic small vessel ischemic change. Authenticated and Electronically Signed by Terrell Galo MD on 01/09/2025 06:39:32 PM    CT Angiogram Chest    Result Date: 1/9/2025  FINAL REPORT TECHNIQUE: null CLINICAL HISTORY: Hypoxia COMPARISON: null FINDINGS: CTA of the chest after administration of IV contrast. Technique: Axial CT images from the lung apices through the adrenal glands after administration of IV contrast. 3D postprocessing and/or MIPS were included.  Comparison: None Findings: Patient motion artifact limits evaluation of the lower lobe pulmonary arteries. No central or large pulmonary embolus. Normal thoracic aorta. No aneurysm or dissection. No mediastinal or axillary adenopathy. Granuloma right lower lobe. No pulmonary nodules. No areas of consolidation. No pneumothorax. Normal bones. Normal limited upper abdomen.     Impression: Impression: Prior granulomatous disease. Authenticated and Electronically Signed by Terrell Galo MD on 01/09/2025 05:48:21 PM    CT Angiogram Neck    Result Date: 1/9/2025  FINAL REPORT TECHNIQUE: null CLINICAL HISTORY: Stroke, follow up slurred speech, left hand no control COMPARISON: null FINDINGS: CTA of the neck after administration of IV contrast. Technique: Axial CT images of the neck. Sagittal and coronal reconstruction images provided. 3D postprocessing and/or MIPS were included. Comparison: None Findings: Right common carotid artery: Normal, no significant stenosis. Right internal carotid artery: Mild disease right carotid bulb, no significant stenosis. Left common carotid artery: Normal, no significant stenosis. Left internal carotid artery: Mild disease left carotid bulb, no significant stenosis. Normal bilateral vertebral arteries. Incidental findings: The parotid, submandibular and thyroid glands are normal. There is no evidence for mass. Normal airway. No masses. No signs of infection. The epiglottis is unremarkable. No adenopathy within the neck. Normal aeration of the paranasal sinuses. Normal orbits and globes. The skull base is unremarkable. The lung apices are normal. No significant findings within the spine.     Impression: Impression: Mild disease bilateral carotid bulbs. Authenticated and Electronically Signed by Terrell Galo MD on 01/09/2025 05:11:26 PM    CT Angiogram Head w AI Analysis of LVO    Result Date: 1/9/2025  FINAL REPORT TECHNIQUE: null CLINICAL HISTORY: Stroke, follow up slurred speech, left  hand no control COMPARISON: null FINDINGS: CTA of the brain. Technique: Axial CTA images of the brain. Sagittal and coronal reconstructions provided. 3D postprocessing and/or MIPS were included. Comparison: None Findings: Normal anterior circulation. This includes the intracranial portion of the internal carotid arteries, anterior cerebral arteries and middle cerebral arteries. No evidence for aneurysm or occlusion. Normal posterior circulation. This includes the vertebral arteries, basilar artery and posterior cerebral arteries. No evidence for aneurysm or occlusion.     Impression: Impression: Normal CTA of the brain. Authenticated and Electronically Signed by Terrell Galo MD on 01/09/2025 05:08:22 PM    CT CEREBRAL PERFUSION WITH & WITHOUT CONTRAST    Result Date: 1/9/2025  CT cerebral perfusion, without and with contrast  HISTORY: Symptoms of CVA. Slurred speech.  TECHNIQUE: CT brain perfusion with mapping of flow parameters including transit time, cerebral blood flow and cerebral blood volume. IV contrast was utilized.  FINDINGS:  Tissue with delayed blood, Tmax > 6 secs:  0 ml  Tissue with significantly reduced blood, rCBF < 30%:  0        Impression: 1.  No evidence of acute large vessel occlusion    This report was signed and finalized on 1/9/2025 4:49 PM by Terrell Sales MD.      CT Head Without Contrast Stroke Protocol    Result Date: 1/9/2025  PROCEDURE: CT HEAD WO CONTRAST STROKE PROTOCOL-  HISTORY: Neuro deficit, acute, stroke suspected, slurred speech  TECHNIQUE: Noncontrast exam  FINDINGS: Brain parenchyma is homogeneous without evidence of hemorrhage, mass effect or edema. No extra-axial abnormality is noted. Ventricles and cisterns appear normal.  The visualized sinuses, orbits and petrous temporal bones appear unremarkable.      Impression: Unremarkable unenhanced CT of the brain.   This study was performed with techniques to keep radiation doses as low as reasonably achievable (ALARA).  Individualized dose reduction techniques using automated exposure control or adjustment of vA and/or kV according to the patient size were employed.    This report was signed and finalized on 1/9/2025 4:42 PM by Terrell Sales MD.     I have reviewed the patient's radiology reports.    Medication Review:     I have reviewed the patient's active and prn medications.     Problem List:      Slurred speech      Assessment:    Slurred speech/possible myoclonus, suspect medication side effect  History of myoclonic epilepsy  Severe anxiety  A-fib on Eliquis  Hypertension  Hyperlipidemia  GERD    Plan:    Slurred speech/possible myoclonus, suspect medication side effect  History of myoclonic epilepsy  Teleneurology following  EEG pending  Gabapentin discontinued.  Started Cymbalta 30 mg daily for neuropathy  Recommend prolonged EEG monitoring  Severe anxiety  Xanax      DVT Prophylaxis: Continue home Eliquis  Code Status: Full code  Diet: Cardiac  Discharge Plan: Likely home tomorrow    Tommy Dia DO  01/10/25  15:51 EST    Dictated utilizing Dragon dictation.

## 2025-01-10 NOTE — PLAN OF CARE
"Goal Outcome Evaluation:  Plan of Care Reviewed With: patient        Progress: no change  Outcome Evaluation: Pt participated well in PT evaluation this date. Pt reports that he lives in a H with 3 NICOL. pt lives with his wife, but is previously IND per report. Pt demonstrated increased confusion this date and struggled to follow directions/ sequence his movements. Pt has no AD at home. Prior to treatment, pt reports that he has 7/10 back pain and has numbness down both of his legs. Pt reported decreased sensation on his L LE when PT administered sensation testing. Pt was able to discriminate location of touch on LEs. Pt came from supine to sitting SBA with cues needed often. Pt completed STS min A with cues, reporting same amount of numbness. Pt ambulates 18 ft  min A with cues using RW. Pt shuffles his feet and demonstrates increased tremoring. Pt returned to his bed where he was placed in the chair position to alleviate some pain. PT discussed STR with pt who seemed to be resistant to \"going to a nursing home\". Pt educated on benefits of STR and difference between wings in nursing homes. Pt is expected to benefit from skilled PT during this inpatient stay to maxmize functional mobility and IND. Pt would further benefit from STR upon dc.    Anticipated Discharge Disposition (PT): inpatient rehabilitation facility                        "

## 2025-01-10 NOTE — CASE MANAGEMENT/SOCIAL WORK
Case Management Discharge Note                Selected Continued Care - Admitted Since 1/9/2025       Destination    No services have been selected for the patient.                Durable Medical Equipment    No services have been selected for the patient.                Dialysis/Infusion    No services have been selected for the patient.                Home Medical Care    No services have been selected for the patient.                Therapy    No services have been selected for the patient.                Community Resources    No services have been selected for the patient.                [facility] [place] have been [place] for the patient.                    Transportation Services  Private: Car    Final Discharge Disposition Code: 01 - home or self-care

## 2025-01-10 NOTE — PROGRESS NOTES
I was paged by Dr. Brian Kerley regarding this patient who was seen yesterday by my colleague Dr. Lopez for slurred speech and confusion.  Discussed with nursing staff, stated that the patient is having multiple staring spells seizures, his seizures would be a stating spell followed by postictal confusion.  Patient received 1 g of Keppra and 1 g of Depakote at 9 PM.  On my interview he was awake, alert and oriented to self, age, he was confused to the month he told me December and to the year he stated 2024, he was able to follow multiple simple commands, moving all extremities against gravity, he had asterixis negative myoclonus on the upper extremities, no seizure like activity appreciated I have low suspicion for ongoing status epilepticus.  He had an MRI brain that I personally reviewed that showed no acute stroke, bleeding or brain tumor.  I advised him that we will get an EEG on him in the morning.  Will continue his current antiseizure medications.  I ordered as needed Ativan 1 mg every 4 hours for seizures lasting more than 5 minutes.

## 2025-01-11 VITALS
HEIGHT: 72 IN | HEART RATE: 73 BPM | TEMPERATURE: 97.9 F | SYSTOLIC BLOOD PRESSURE: 146 MMHG | OXYGEN SATURATION: 97 % | WEIGHT: 238.1 LBS | DIASTOLIC BLOOD PRESSURE: 86 MMHG | BODY MASS INDEX: 32.25 KG/M2 | RESPIRATION RATE: 18 BRPM

## 2025-01-11 LAB — GLUCOSE BLDC GLUCOMTR-MCNC: 113 MG/DL (ref 70–130)

## 2025-01-11 PROCEDURE — 99214 OFFICE O/P EST MOD 30 MIN: CPT | Performed by: STUDENT IN AN ORGANIZED HEALTH CARE EDUCATION/TRAINING PROGRAM

## 2025-01-11 PROCEDURE — G0378 HOSPITAL OBSERVATION PER HR: HCPCS

## 2025-01-11 PROCEDURE — 94664 DEMO&/EVAL PT USE INHALER: CPT

## 2025-01-11 PROCEDURE — 82948 REAGENT STRIP/BLOOD GLUCOSE: CPT | Performed by: INTERNAL MEDICINE

## 2025-01-11 PROCEDURE — 94799 UNLISTED PULMONARY SVC/PX: CPT

## 2025-01-11 PROCEDURE — 99239 HOSP IP/OBS DSCHRG MGMT >30: CPT | Performed by: INTERNAL MEDICINE

## 2025-01-11 RX ORDER — DULOXETIN HYDROCHLORIDE 30 MG/1
30 CAPSULE, DELAYED RELEASE ORAL DAILY
Qty: 30 CAPSULE | Refills: 0 | Status: SHIPPED | OUTPATIENT
Start: 2025-01-12 | End: 2025-02-11

## 2025-01-11 RX ADMIN — METOPROLOL TARTRATE 25 MG: 25 TABLET, FILM COATED ORAL at 09:34

## 2025-01-11 RX ADMIN — BUDESONIDE AND FORMOTEROL FUMARATE DIHYDRATE 2 PUFF: 160; 4.5 AEROSOL RESPIRATORY (INHALATION) at 07:05

## 2025-01-11 RX ADMIN — LEVETIRACETAM 1000 MG: 500 TABLET, FILM COATED ORAL at 09:34

## 2025-01-11 RX ADMIN — DIVALPROEX SODIUM 1000 MG: 500 TABLET, DELAYED RELEASE ORAL at 09:34

## 2025-01-11 RX ADMIN — Medication 10 ML: at 09:34

## 2025-01-11 RX ADMIN — DULOXETINE HYDROCHLORIDE 30 MG: 30 CAPSULE, DELAYED RELEASE ORAL at 09:33

## 2025-01-11 RX ADMIN — APIXABAN 5 MG: 5 TABLET, FILM COATED ORAL at 09:34

## 2025-01-11 RX ADMIN — ASPIRIN 81 MG: 81 TABLET, COATED ORAL at 09:33

## 2025-01-11 RX ADMIN — ALPRAZOLAM 1 MG: 0.5 TABLET ORAL at 09:33

## 2025-01-11 RX ADMIN — PANTOPRAZOLE SODIUM 40 MG: 40 TABLET, DELAYED RELEASE ORAL at 09:34

## 2025-01-11 NOTE — PLAN OF CARE
Goal Outcome Evaluation:                   Problem: Adult Inpatient Plan of Care  Goal: Plan of Care Review  Outcome: Progressing  Flowsheets (Taken 1/10/2025 1144 by Tommy Haq, PT)  Plan of Care Reviewed With: patient  Goal: Patient-Specific Goal (Individualized)  Outcome: Progressing  Goal: Absence of Hospital-Acquired Illness or Injury  Outcome: Progressing  Intervention: Identify and Manage Fall Risk  Description: Perform standard risk assessment on admission using a validated tool or comprehensive approach appropriate to the patient; reassess fall risk frequently, with change in status or transfer to another level of care.  Communicate risk to interprofessional healthcare team; ensure fall risk visible cue.  Determine need for increased observation, equipment and environmental modification, as well as use of supportive, nonskid footwear.  Adjust safety measures to individual needs and identified risk factors.  Reinforce the importance of active participation with fall risk prevention, safety, and physical activity with the patient and family.  Perform regular intentional rounding to assess need for position change, pain assessment and personal needs, including assistance with toileting.  Recent Flowsheet Documentation  Taken 1/10/2025 1800 by Sveta Page RN  Safety Promotion/Fall Prevention:   safety round/check completed   room organization consistent   nonskid shoes/slippers when out of bed   fall prevention program maintained   clutter free environment maintained   assistive device/personal items within reach   activity supervised  Intervention: Prevent Skin Injury  Description: Perform a screening for skin injury risk, such as pressure or moisture-associated skin damage on admission and at regular intervals throughout hospital stay.  Keep all areas of skin (especially folds) clean and dry.  Maintain adequate skin hydration.  Relieve and redistribute pressure and protect bony prominences and  skin at risk for injury; implement measures based on patient-specific risk factors.  Match turning and repositioning schedule to clinical condition.  Encourage weight shift frequently; assist with reposition if unable to complete independently.  Float heels off bed; avoid pressure on the Achilles tendon.  Keep skin free from extended contact with medical devices.  Optimize nutrition and hydration.  Encourage functional activity and mobility, as early as tolerated.  Use aids (e.g., slide boards, mechanical lift) during transfer.  Recent Flowsheet Documentation  Taken 1/10/2025 1800 by Sveta Page RN  Body Position: sitting up in bed  Intervention: Prevent Infection  Description: Maintain skin and mucous membrane integrity; promote hand, oral and pulmonary hygiene.  Optimize fluid balance, nutrition, sleep and glycemic control to maximize infection resistance.  Identify potential sources of infection early to prevent or mitigate progression of infection (e.g., wound, lines, devices).  Evaluate ongoing need for invasive devices; remove promptly when no longer indicated.  Review vaccination status.  Recent Flowsheet Documentation  Taken 1/10/2025 1800 by Sveta Page RN  Infection Prevention:   environmental surveillance performed   equipment surfaces disinfected   hand hygiene promoted   personal protective equipment utilized   rest/sleep promoted   single patient room provided   visitors restricted/screened  Goal: Optimal Comfort and Wellbeing  Outcome: Progressing  Goal: Readiness for Transition of Care  Outcome: Progressing     Problem: Comorbidity Management  Goal: Maintenance of Behavioral Health Symptom Control  Outcome: Progressing  Goal: Blood Glucose Level Within Target Range  Outcome: Progressing  Goal: Blood Pressure in Desired Range  Outcome: Progressing  Goal: Maintenance of Seizure Control  Outcome: Progressing     Problem: Skin Injury Risk Increased  Goal: Skin Health and  Integrity  Outcome: Progressing  Intervention: Optimize Skin Protection  Description: Perform a full pressure injury risk assessment, as indicated by screening, upon admission to care unit.  Reassess skin (full inspection and injury risk, including skin temperature, consistency and color) frequently (e.g., scheduled interval, with change in condition) to provide optimal early detection and prevention.  Maintain adequate tissue perfusion (e.g., encourage fluid balance; avoid crossing legs, constrictive clothing or devices) to promote tissue oxygenation.  Maintain head of bed at lowest degree of elevation tolerated, considering medical condition and other restrictions. Use positioning supports to prevent sliding and friction. Consider low friction textiles.  Avoid positioning onto an area that remains reddened or on bony prominences.  Minimize incontinence and moisture (e.g., toileting schedule; moisture-wicking pad, diaper or incontinence collection device; skin moisture barrier).  Cleanse skin promptly and gently, when soiled, utilizing a pH-balanced cleanser.  Relieve and redistribute pressure (e.g., scheduled position changes, weight shifts, use of support surface, medical device repositioning, protective dressing application, use of positioning device, microclimate control, use of pressure-injury-monitor  Encourage increased activity, such as sitting in a chair at the bedside or early mobilization, when able to tolerate. Avoid prolonged sitting.  Recent Flowsheet Documentation  Taken 1/10/2025 1800 by Sveta Page, RN  Activity Management: activity encouraged  Head of Bed (HOB) Positioning: HOB at 45 degrees     Problem: Fall Injury Risk  Goal: Absence of Fall and Fall-Related Injury  Outcome: Progressing  Intervention: Promote Injury-Free Environment  Description: Provide a safe, barrier-free environment that encourages independent activity.  Keep care area uncluttered and well-lighted.  Determine need  for increased observation or monitoring.  Avoid use of devices that minimize mobility, such as restraints or indwelling urinary catheter.  Recent Flowsheet Documentation  Taken 1/10/2025 1800 by Sveta Page RN  Safety Promotion/Fall Prevention:   safety round/check completed   room organization consistent   nonskid shoes/slippers when out of bed   fall prevention program maintained   clutter free environment maintained   assistive device/personal items within reach   activity supervised

## 2025-01-11 NOTE — PROGRESS NOTES
"DOS: 2025  NAME: Sheng Dominguez   : 1970  PCP: Tracey Adam APRN  Chief Complaint   Patient presents with    Speech Problem    Difficulty Walking       Chief complaint: Asterixis, negative myoclonus and staring spells.  Subjective: Patient seen and examined via teleneurology video call with the help of the rounding nurse, no events overnight myoclonus significantly improved.    Objective:  Vital signs: /86 (BP Location: Left arm, Patient Position: Lying)   Pulse 73   Temp 97.9 °F (36.6 °C) (Oral)   Resp 18   Ht 182.9 cm (72.01\")   Wt 108 kg (238 lb 1.6 oz)   SpO2 97%   BMI 32.28 kg/m²    Gen: NAD, vitals reviewed  MS: oriented x3, recent/remote memory intact, normal attention/concentration, language intact, no neglect.  CN: visual acuity grossly normal, PERRL, EOMI, no facial droop, no dysarthria  Motor: 5/5 throughout upper and lower extremities, normal tone  Sensory: intact to light touch all 4 ext.  Abnormal movements: Right upper extremity tremor, no asterixis/myoclonus    Laboratory results:  Lab Results   Component Value Date    GLUCOSE 120 (H) 01/10/2025    CALCIUM 9.3 01/10/2025     (H) 01/10/2025    K 4.1 01/10/2025    CO2 26.4 01/10/2025     (H) 01/10/2025    BUN 12 01/10/2025    CREATININE 0.78 01/10/2025    BCR 15.4 01/10/2025    ANIONGAP 9.6 01/10/2025     Lab Results   Component Value Date    WBC 4.70 01/10/2025    HGB 12.9 (L) 01/10/2025    HCT 38.0 01/10/2025    MCV 96.9 01/10/2025     01/10/2025     Lab Results   Component Value Date    LDL 78 2025         Lab 25  1631   HEMOGLOBIN A1C 5.70*        Review of labs: Above labs reviewed    Review and interpretation of imaging: No new imaging to review    Workup:  MRI brain showed nonspecific periventricular white matter disease, no acute finding.  EEG pending    Diagnoses:  -Asterixis/negative myoclonus side effect from high-dose gabapentin significantly improved after holding the " "gabapentin      Plan:  1.  Very low suspicion for ongoing seizures okay to hold on the EEG and do it as an outpatient  2.  Started on Cymbalta 30 mg daily for peripheral neuropathy, can go up to 60 if needed  3.  If the peripheral neuropathy gets worse, can start small dose gabapentin 100 to 200 mg 3 times daily and follow the side effect  4.  Okay to discharge from neurology standpoint    I spent 30 minutes on this follow-up encounter.    This was an audio and video enabled telemedicine encounter.    Management discussed with hospitalist, Dr. Dia    \"Dictated utilizing Dragon dictation\".  "

## 2025-01-11 NOTE — DISCHARGE SUMMARY
[facility]      Name:  Sheng Dominguez   Age:  54 y.o.  Sex:  male  :  1970  MRN:  [id number]   Visit Number:  [id number]    Admission Date:  2025  Date of Discharge:  2025  Primary Care Physician:  Tracey Adam APRN      Discharge Diagnoses:     Slurred speech/possible myoclonus, suspect medication side effect  History of myoclonic epilepsy  Severe anxiety  A-fib on Eliquis  Hypertension  Hyperlipidemia  GERD    Problem List:     Active Hospital Problems    Diagnosis  POA    **Slurred speech [R47.81]  Yes      Resolved Hospital Problems   No resolved problems to display.     Presenting Problem:    Chief Complaint   Patient presents with    Speech Problem    Difficulty Walking      Consults:     Consulting Physician(s)         Provider   Role Specialty     Robert Lopez MD      Consulting Physician Neurology          Procedures Performed:        History of presenting illness/Hospital Course:    Sheng Dominguez is a 54-year-old man with past medical history of Atrial fibrillation on Eliquis, seizure disorder on Depakote and Keppra, asthma, hypertension, hyperlipidemia, anxiety, GERD.  Presented to [facility] ED on 2025 with concern for slurred speech onset 1 PM when woken up from a nap.  Last known well was around 7 AM day of presentation.  Associated with headache, dizziness, difficulty talking.  Denied fevers or chills, chest pain, shortness of air, neck pain, abdominal pain.     ED summary: Afebrile, tachycardic, hypertensive, ED physician reports other vital signs stable on room air.  EKG sinus rhythm.  Troponin not elevated.  Blood glucose 100.  A1c 5.7%.  TSH within normal limits.  Panel within normal limits.  Ammonia not elevated.  INR 1.16.  CBC unremarkable.  Urinalysis unremarkable.  CT head unremarkable.  CT cerebral perfusion no evidence of acute large vessel occlusion.  CT angiogram head and neck mild disease bilateral carotid bulbs,  normal CTA of the brain.  CT angiogram chest prior granulomatous disease.  ED consulted neurology for stroke evaluation.    MRI brain showed mild nonspecific periventricular and deep white matter disease.  No signs of acute ischemic event.  2D echocardiogram revealed EF 64%, normal diastolic filling pattern, negative bubble study.  Teleneurology suggested that myoclonic is worsened by use of gabapentin.  This was discontinued.  Within 24 hours, myoclonus resolved.  Patient significantly improved.  He was instructed to discontinue gabapentin indefinitely.  Unable to obtain EEG prior to discharge.  Discussed case with teleneurology who recommended outpatient follow-up for EEG.  Initiated Cymbalta.  Strict return instructions given.      Vital Signs:    Temp:  [97.6 °F (36.4 °C)-99.2 °F (37.3 °C)] 97.9 °F (36.6 °C)  Heart Rate:  [69-73] 73  Resp:  [18] 18  BP: (145-169)/(82-91) 146/86    Physical Exam:    General Appearance:  Alert and cooperative.    Head:  Atraumatic and normocephalic.   Eyes: Conjunctivae and sclerae normal, no icterus. No pallor.   Ears:  Ears with no abnormalities noted.   Throat: No oral lesions, no thrush, oral mucosa moist.   Neck: Supple, trachea midline, no thyromegaly.       Lungs:   Breath sounds heard bilaterally equally.  No crackles or wheezing.  Unlabored breathing on room air   Heart:  Normal S1 and S2, no murmur, No JVD.   Abdomen:   Normal bowel sounds, Soft, nontender, nondistended, no rebound tenderness.   Extremities: Supple, no edema, no cyanosis, no clubbing.   Pulses: Pulses palpable bilaterally.   Skin: No bleeding or rash.   Neurologic: Alert and oriented x 3. No facial asymmetry. Moves all four limbs.      Pertinent Lab Results:     Results from last 7 days   Lab Units 01/10/25  0524 01/09/25  1631   SODIUM mmol/L 146* 142   POTASSIUM mmol/L 4.1 4.3   CHLORIDE mmol/L 110* 106   CO2 mmol/L 26.4 26.5   BUN mg/dL 12 14   CREATININE mg/dL 0.78 1.02   CALCIUM mg/dL 9.3 9.6    BILIRUBIN mg/dL  --  0.3   ALK PHOS U/L  --  56   ALT (SGPT) U/L  --  19   AST (SGOT) U/L  --  23   GLUCOSE mg/dL 120* 100*     Results from last 7 days   Lab Units 01/10/25  0524 01/09/25  1631   WBC 10*3/mm3 4.70 4.95   HEMOGLOBIN g/dL 12.9* 13.8   HEMATOCRIT % 38.0 41.0   PLATELETS 10*3/mm3 157 163     Results from last 7 days   Lab Units 01/09/25  1631   INR  1.16*     Results from last 7 days   Lab Units 01/09/25  1853 01/09/25  1747   HSTROP T ng/L 11 10                           Pertinent Radiology Results:    Imaging Results (All)       Procedure Component Value Units Date/Time    XR Chest 1 View [[id number]] Collected: 01/10/25 1329     Updated: 01/10/25 1331    Narrative:      PROCEDURE: XR CHEST 1 VW-     HISTORY: Acute Stroke Protocol (onset < 12 hrs), slurred speech     COMPARISON: 12/4/2024     FINDINGS:  Low lung volumes are noted.  The lungs are grossly clear.       There is no evidence of effusion or other pleural disease.  The  mediastinum has a normal appearance.      The cardiac silhouette is unremarkable.       Impression:      Hypoinflation.        This report was signed and finalized on 1/10/2025 1:29 PM by Terrell Sales MD.       MRI Brain Without Contrast [[id number]] Collected: 01/09/25 1839     Updated: 01/09/25 1840    Narrative:      FINAL REPORT    TECHNIQUE:  null    CLINICAL HISTORY:  Seizure, stroke like sx pt reports he fell asleep around 0730 on  this day with sharp head pain, woke up around 1330 with slurred  speech and difficulty ambulating. THIN DWI SEQ OF BRAIN STEM IN  AXIAL AND COR SENT.    COMPARISON:  null    FINDINGS:  Noncontrast MRI of the brain.    Technique: Sagittal T1, coronal T2, axial T1, T2, FLAIR and diffusion-weighted imaging.    Comparison:    CT - CT ANGIOGRAM CHEST - 1/9/25 16:41 EST    Findings:    Normal signal on diffusion-weighted imaging. No evidence for acute ischemic event.    Mild nonspecific periventricular and deep white matter disease. This most  often can be ascribed to chronic small vessel ischemic change. There are no intracranial masses. No evidence for hemorrhage. The ventricles are normal size, no hydrocephalus.    Contents of the posterior fossa including brainstem and cerebellum are unremarkable.    Normal vascular flow voids.    Normal aeration of paranasal sinuses.      Impression:      Impression:    Mild nonspecific periventricular and deep white matter disease. This most often can be ascribed to chronic small vessel ischemic change.    Authenticated and Electronically Signed by Terrell Galo MD on  01/09/2025 06:39:32 PM    CT Angiogram Chest [[id number]] Collected: 01/09/25 1748     Updated: 01/09/25 1748    Narrative:      FINAL REPORT    TECHNIQUE:  null    CLINICAL HISTORY:  Hypoxia    COMPARISON:  null    FINDINGS:  CTA of the chest after administration of IV contrast.    Technique: Axial CT images from the lung apices through the adrenal glands after administration of IV contrast. 3D postprocessing and/or MIPS were included.    Comparison:    None    Findings: Patient motion artifact limits evaluation of the lower lobe pulmonary arteries. No central or large pulmonary embolus.    Normal thoracic aorta. No aneurysm or dissection.    No mediastinal or axillary adenopathy.    Granuloma right lower lobe. No pulmonary nodules. No areas of consolidation. No pneumothorax.    Normal bones.    Normal limited upper abdomen.      Impression:      Impression:    Prior granulomatous disease.    Authenticated and Electronically Signed by Terrell Galo MD on  01/09/2025 05:48:21 PM    CT Angiogram Neck [[id number]] Collected: 01/09/25 1711     Updated: 01/09/25 1711    Narrative:      FINAL REPORT    TECHNIQUE:  null    CLINICAL HISTORY:  Stroke, follow up    slurred speech, left hand no control    COMPARISON:  null    FINDINGS:  CTA of the neck after administration of IV contrast.    Technique: Axial CT images of the neck. Sagittal and coronal  reconstruction images provided. 3D postprocessing and/or MIPS were included.    Comparison:    None    Findings:    Right common carotid artery: Normal, no significant stenosis.    Right internal carotid artery: Mild disease right carotid bulb, no significant stenosis.    Left common carotid artery: Normal, no significant stenosis.    Left internal carotid artery: Mild disease left carotid bulb, no significant stenosis.    Normal bilateral vertebral arteries.    Incidental findings:    The parotid, submandibular and thyroid glands are normal. There is no evidence for mass.    Normal airway. No masses. No signs of infection. The epiglottis is unremarkable.    No adenopathy within the neck.    Normal aeration of the paranasal sinuses. Normal orbits and globes.    The skull base is unremarkable.    The lung apices are normal.    No significant findings within the spine.      Impression:      Impression:    Mild disease bilateral carotid bulbs.    Authenticated and Electronically Signed by Terrell Galo MD on  01/09/2025 05:11:26 PM    CT Angiogram Head w AI Analysis of LVO [[id number]] Collected: 01/09/25 1708     Updated: 01/09/25 1708    Narrative:      FINAL REPORT    TECHNIQUE:  null    CLINICAL HISTORY:  Stroke, follow up    slurred speech, left hand no control    COMPARISON:  null    FINDINGS:  CTA of the brain.    Technique: Axial CTA images of the brain. Sagittal and coronal reconstructions provided. 3D postprocessing and/or MIPS were included.    Comparison:    None    Findings:    Normal anterior circulation. This includes the intracranial portion of the internal carotid arteries, anterior cerebral arteries and middle cerebral arteries. No evidence for aneurysm or occlusion.    Normal posterior circulation. This includes the vertebral arteries, basilar artery and posterior cerebral arteries. No evidence for aneurysm or occlusion.      Impression:      Impression:    Normal CTA of the brain.    Authenticated  and Electronically Signed by Terrell Galo MD on  01/09/2025 05:08:22 PM    CT CEREBRAL PERFUSION WITH & WITHOUT CONTRAST [[id number]] Collected: 01/09/25 1648     Updated: 01/09/25 1651    Narrative:      CT cerebral perfusion, without and with contrast     HISTORY: Symptoms of CVA. Slurred speech.     TECHNIQUE: CT brain perfusion with mapping of flow parameters including  transit time, cerebral blood flow and cerebral blood volume. IV contrast  was utilized.     FINDINGS:     Tissue with delayed blood, Tmax > 6 secs:  0 ml     Tissue with significantly reduced blood, rCBF < 30%:  0              Impression:      1.  No evidence of acute large vessel occlusion           This report was signed and finalized on 1/9/2025 4:49 PM by Terrell Sales MD.       CT Head Without Contrast Stroke Protocol [[id number]] Collected: 01/09/25 1642     Updated: 01/09/25 1645    Narrative:      PROCEDURE: CT HEAD WO CONTRAST STROKE PROTOCOL-     HISTORY: Neuro deficit, acute, stroke suspected, slurred speech     TECHNIQUE: Noncontrast exam     FINDINGS: Brain parenchyma is homogeneous without evidence of  hemorrhage, mass effect or edema. No extra-axial abnormality is noted.  Ventricles and cisterns appear normal.     The visualized sinuses, orbits and petrous temporal bones appear  unremarkable.       Impression:      Unremarkable unenhanced CT of the brain.         This study was performed with techniques to keep radiation doses as low  as reasonably achievable (ALARA). Individualized dose reduction  techniques using automated exposure control or adjustment of vA and/or  kV according to the patient size were employed.            This report was signed and finalized on 1/9/2025 4:42 PM by Terrell Sales MD.               Echo:    Results for orders placed during the hospital encounter of 01/09/25    Adult Transthoracic Echo Complete W/ Cont if Necessary Per Protocol (With Agitated Saline)    Interpretation Summary  1.  Normal left  ventricular size and systolic function, 3D EF 64%.  2.  Normal LV diastolic filling pattern.  3.  Normal right ventricular size and systolic function.  4.  Normal left atrial volume index.  5.  Mild tricuspid regurgitation.  6.  Negative bubble study.    Condition on Discharge:      Stable.    Code status during the hospital stay:    Code Status and Medical Interventions: CPR (Attempt to Resuscitate); Full Support   Ordered at: 01/09/25 6112     Code Status (Patient has no pulse and is not breathing):    CPR (Attempt to Resuscitate)     Medical Interventions (Patient has pulse or is breathing):    Full Support     Discharge Disposition:    Home or Self Care    Discharge Medications:       Discharge Medications        New Medications        Instructions Start Date   DULoxetine 30 MG capsule  Commonly known as: CYMBALTA   30 mg, Oral, Daily   Start Date: January 12, 2025            Continue These Medications        Instructions Start Date   albuterol sulfate  (90 Base) MCG/ACT inhaler  Commonly known as: PROVENTIL HFA;VENTOLIN HFA;PROAIR HFA   2 puffs, Inhalation, Every 4 Hours PRN      ALPRAZolam 1 MG tablet  Commonly known as: XANAX   1 mg, 3 Times Daily      apixaban 5 MG tablet tablet  Commonly known as: ELIQUIS   5 mg, Oral, Every 12 Hours Scheduled      aspirin 81 MG EC tablet   81 mg, Oral, Daily      atorvastatin 20 MG tablet  Commonly known as: LIPITOR   20 mg, Oral, Nightly      cetirizine 5 MG tablet  Commonly known as: zyrTEC   5 mg, Daily PRN      coenzyme Q10 50 MG capsule capsule   50 mg, Daily      divalproex 500 MG DR tablet  Commonly known as: DEPAKOTE   1,000 mg, 2 Times Daily      fluticasone 50 MCG/ACT nasal spray  Commonly known as: FLONASE       Fluticasone-Salmeterol 250-50 MCG/ACT DISKUS  Commonly known as: ADVAIR/WIXELA       hyoscyamine 0.125 MG tablet  Commonly known as: ANASPAZ,LEVSIN   Take 1 tablet by mouth Every 6 (Six) Hours As Needed for Cramping or Diarrhea.      ipratropium  0.06 % nasal spray  Commonly known as: ATROVENT       levETIRAcetam 1000 MG tablet  Commonly known as: KEPPRA   1,000 mg, 2 Times Daily      Magnesium Oxide -Mg Supplement 400 (240 Mg) MG tablet   400 mg, Daily      metoprolol tartrate 25 MG tablet  Commonly known as: LOPRESSOR   25 mg, 2 Times Daily      montelukast 10 MG tablet  Commonly known as: SINGULAIR       Omega-3 1000 MG capsule   1 capsule, Daily      pantoprazole 40 MG EC tablet  Commonly known as: PROTONIX   40 mg, Daily      Testosterone 1.62 % gel   Apply  topically Daily. ONCE DAILY      traZODone 300 MG tablet  Commonly known as: DESYREL   150 mg, Nightly      vitamin C 250 MG tablet  Commonly known as: ASCORBIC ACID   1,000 mg, Daily             Stop These Medications      gabapentin 600 MG tablet  Commonly known as: NEURONTIN            Discharge Diet:     Diet Instructions       Diet: Cardiac Diets; Healthy Heart (2-3 Na+); Regular (IDDSI 7); Thin (IDDSI 0)      Discharge Diet: Cardiac Diets    Cardiac Diet: Healthy Heart (2-3 Na+)    Texture: Regular (IDDSI 7)    Fluid Consistency: Thin (IDDSI 0)          Activity at Discharge:       Follow-up Appointments:    Additional Instructions for the Follow-ups that You Need to Schedule       Discharge Follow-up with PCP   As directed       Currently Documented PCP:    Tracey Adam APRN    PCP Phone Number:    [phone]     Follow Up Details: 1 week        Discharge Follow-up with Specified Provider: Neurology; 3 Weeks   As directed      To: Neurology   Follow Up: 3 Weeks   Follow Up Details: outpatient EEG               Follow-up Information       Tracey Adam APRN .    Specialty: Family Medicine  Why: 1 week  Contact information:  [address] [place] [address]  [phone]                           Future Appointments   Date Time Provider Department Center   3/21/2025 11:45 AM Thomas George MD [facility]   6/20/2025 11:30 AM Thomas George MD [facility]    12/12/2025 11:30 AM Thomas George MD [facility]     Test Results Pending at Discharge:    Pending Results       Procedure [Order ID] Specimen - Date/Time    Levetiracetam Level (Keppra) [[id number]] Collected: 01/09/25 1651    Specimen: Blood Updated: 01/09/25 1740                 Tommy Dia DO  01/11/25  12:58 EST    Time: I spent >30 minutes on this discharge activity which included: face-to-face encounter with the patient, reviewing the data in the system, coordination of the care with the nursing staff as well as consultants, documentation, and entering orders.     Dictated utilizing Dragon dictation.

## 2025-01-11 NOTE — PLAN OF CARE
Goal Outcome Evaluation:  Plan of Care Reviewed With: patient, spouse              Problem: Adult Inpatient Plan of Care  Goal: Plan of Care Review  Outcome: Met  Flowsheets (Taken 1/11/2025 1004)  Plan of Care Reviewed With:   patient   spouse  Goal: Patient-Specific Goal (Individualized)  Outcome: Met  Goal: Absence of Hospital-Acquired Illness or Injury  Outcome: Met  Intervention: Identify and Manage Fall Risk  Description: Perform standard risk assessment on admission using a validated tool or comprehensive approach appropriate to the patient; reassess fall risk frequently, with change in status or transfer to another level of care.  Communicate risk to interprofessional healthcare team; ensure fall risk visible cue.  Determine need for increased observation, equipment and environmental modification, as well as use of supportive, nonskid footwear.  Adjust safety measures to individual needs and identified risk factors.  Reinforce the importance of active participation with fall risk prevention, safety, and physical activity with the patient and family.  Perform regular intentional rounding to assess need for position change, pain assessment and personal needs, including assistance with toileting.  Recent Flowsheet Documentation  Taken 1/11/2025 1000 by Sveta Page, RN  Safety Promotion/Fall Prevention:   safety round/check completed   room organization consistent   nonskid shoes/slippers when out of bed   fall prevention program maintained   clutter free environment maintained   assistive device/personal items within reach   activity supervised  Taken 1/11/2025 0800 by Sveta Page, RN  Safety Promotion/Fall Prevention:   safety round/check completed   room organization consistent   nonskid shoes/slippers when out of bed   fall prevention program maintained   clutter free environment maintained   assistive device/personal items within reach   activity supervised  Intervention: Prevent Skin  Injury  Description: Perform a screening for skin injury risk, such as pressure or moisture-associated skin damage on admission and at regular intervals throughout hospital stay.  Keep all areas of skin (especially folds) clean and dry.  Maintain adequate skin hydration.  Relieve and redistribute pressure and protect bony prominences and skin at risk for injury; implement measures based on patient-specific risk factors.  Match turning and repositioning schedule to clinical condition.  Encourage weight shift frequently; assist with reposition if unable to complete independently.  Float heels off bed; avoid pressure on the Achilles tendon.  Keep skin free from extended contact with medical devices.  Optimize nutrition and hydration.  Encourage functional activity and mobility, as early as tolerated.  Use aids (e.g., slide boards, mechanical lift) during transfer.  Recent Flowsheet Documentation  Taken 1/11/2025 1000 by Sveta Page RN  Body Position:   supine   position changed independently  Taken 1/11/2025 0800 by Sveta Page RN  Body Position: sitting up in bed  Intervention: Prevent Infection  Description: Maintain skin and mucous membrane integrity; promote hand, oral and pulmonary hygiene.  Optimize fluid balance, nutrition, sleep and glycemic control to maximize infection resistance.  Identify potential sources of infection early to prevent or mitigate progression of infection (e.g., wound, lines, devices).  Evaluate ongoing need for invasive devices; remove promptly when no longer indicated.  Review vaccination status.  Recent Flowsheet Documentation  Taken 1/11/2025 0800 by Sveta Page RN  Infection Prevention:   environmental surveillance performed   equipment surfaces disinfected   hand hygiene promoted   personal protective equipment utilized   rest/sleep promoted   single patient room provided   visitors restricted/screened  Goal: Optimal Comfort and Wellbeing  Outcome:  Met  Intervention: Provide Person-Centered Care  Description: Use a family-focused approach to care; encourage support system presence and participation.  Develop trust and rapport by proactively providing information, encouraging questions, addressing concerns and offering reassurance.  Acknowledge emotional response to hospitalization.  Recognize and utilize personal coping strategies and strengths; develop goals via shared decision-making.  Honor spiritual and cultural preferences.  Recent Flowsheet Documentation  Taken 1/11/2025 0800 by Sveta Paeg RN  Trust Relationship/Rapport:   care explained   choices provided   questions answered   questions encouraged   thoughts/feelings acknowledged  Goal: Readiness for Transition of Care  Outcome: Met     Problem: Comorbidity Management  Goal: Maintenance of Behavioral Health Symptom Control  Outcome: Met  Intervention: Maintain Behavioral Health Symptom Control  Description: Confirm mental health diagnosis and current treatment.  Evaluate participation with previously identified self-management plan.  Advocate continuation of home strategies, including medication.  Evaluate effectiveness of self-management strategies and coping skills.  Consider impact of mental health diagnosis on the current acute medical condition.  Communicate with providers to ensure continuity and follow-up at transition.  Recent Flowsheet Documentation  Taken 1/11/2025 0800 by Sveta Page RN  Medication Review/Management: medications reviewed  Goal: Blood Glucose Level Within Target Range  Outcome: Met  Intervention: Monitor and Manage Glycemia  Description: Establish target blood glucose levels based on patient-specific factors, such as age, diabetes-related complications and illness severity.  Document blood glucose levels and monitor trend.  Provide antihyperglycemic pharmacologic therapy to maintain blood glucose levels within targeted range.  Advocate for patient use of  home devices such as insulin pump, continuous glucose monitor; assess for safe and competent participation in care.  Check blood glucose level if there is a change in mental or cognitive status.  Recognize, treat and document hypoglycemia event and potential cause.  Assess current lifestyle patterns; acknowledging positive patterns supporting wellbeing.  Evaluate effectiveness of coping skills; encourage expression of feelings, expectations and concerns related to disease management and quality of life; reinforce education to enhance management plan and wellbeing.  Recent Flowsheet Documentation  Taken 1/11/2025 0800 by Sveta Page RN  Medication Review/Management: medications reviewed  Goal: Blood Pressure in Desired Range  Outcome: Met  Intervention: Maintain Blood Pressure Management  Description: Evaluate adherence to home antihypertensive regimen (e.g., exercise and activity, diet modification, medication).  Provide scheduled antihypertensive medication; consider administration time and effects (e.g., avoid giving diuretic prior to bedtime).  Monitor response to antihypertensive medication therapy (e.g., blood pressure, electrolyte levels, medication effects).  Minimize risk of orthostatic hypotension; encourage caution with position changes, particularly if elderly.  Recent Flowsheet Documentation  Taken 1/11/2025 0800 by Sveta Page RN  Medication Review/Management: medications reviewed  Goal: Maintenance of Seizure Control  Outcome: Met  Intervention: Maintain Seizure Symptom Control  Description: Identify risks that may lower seizure threshold (e.g., hypoglycemia, illness, change in medications); assist in treatment.  Evaluate adherence to management plan (e.g., medication, symptom control, trigger avoidance, self-monitoring).  Advocate for continuation of home regimen, including medication, medication administration schedule, ketogenic diet, sleep schedule and symptom monitoring;  acknowledge home management and routine.  Minimize potential seizure triggers, such as stress, sleep deprivation and diet.  Evaluate effectiveness of coping skills; encourage expression of feelings, expectations and concerns related to disease management and quality of life; reinforce education to enhance management plan and wellbeing.  Maintain seizure precautions, such as removal of potential harmful objects, padded side rails, bed in low position, suction and airway protection equipment readily available.  Provide a quiet, calm environment.  Recent Flowsheet Documentation  Taken 1/11/2025 0800 by Sveta Page RN  Medication Review/Management: medications reviewed     Problem: Skin Injury Risk Increased  Goal: Skin Health and Integrity  Outcome: Met  Intervention: Optimize Skin Protection  Description: Perform a full pressure injury risk assessment, as indicated by screening, upon admission to care unit.  Reassess skin (full inspection and injury risk, including skin temperature, consistency and color) frequently (e.g., scheduled interval, with change in condition) to provide optimal early detection and prevention.  Maintain adequate tissue perfusion (e.g., encourage fluid balance; avoid crossing legs, constrictive clothing or devices) to promote tissue oxygenation.  Maintain head of bed at lowest degree of elevation tolerated, considering medical condition and other restrictions. Use positioning supports to prevent sliding and friction. Consider low friction textiles.  Avoid positioning onto an area that remains reddened or on bony prominences.  Minimize incontinence and moisture (e.g., toileting schedule; moisture-wicking pad, diaper or incontinence collection device; skin moisture barrier).  Cleanse skin promptly and gently, when soiled, utilizing a pH-balanced cleanser.  Relieve and redistribute pressure (e.g., scheduled position changes, weight shifts, use of support surface, medical device  repositioning, protective dressing application, use of positioning device, microclimate control, use of pressure-injury-monitor  Encourage increased activity, such as sitting in a chair at the bedside or early mobilization, when able to tolerate. Avoid prolonged sitting.  Recent Flowsheet Documentation  Taken 1/11/2025 1000 by Sveta Page RN  Activity Management: activity encouraged  Head of Bed (HOB) Positioning: HOB at 30-45 degrees  Taken 1/11/2025 0800 by Sveta Page, RN  Activity Management: activity encouraged  Pressure Reduction Techniques: frequent weight shift encouraged  Head of Bed (HOB) Positioning: HOB at 45 degrees     Problem: Fall Injury Risk  Goal: Absence of Fall and Fall-Related Injury  Outcome: Met  Intervention: Identify and Manage Contributors  Description: Develop a fall prevention plan, considering patient-centered interventions and family/caregiver involvement; identify and address patient's facilitators and barriers.  Provide reorientation, appropriate sensory stimulation and routines with changes in mental status to decrease risk of fall.  Promote use of personal vision and auditory aids.  Assess assistance level required for safe and effective self-care; provide support as needed, such as toileting and mobilization. For age 65 and older, implement timed toileting with assistance.  Encourage physical activity, such as performance of mobility and self-care at highest level of patient ability, multicomponent exercise program and provision of appropriate assistive devices.  If fall occurs, assess the severity of injury; implement fall injury protocol. Determine the cause and revise fall injury prevention plan.  Regularly review and advocate for medication adjustment to decrease fall risk; consider administration times, polypharmacy and age.  Balance adequate pain management with potential for oversedation.  Recent Flowsheet Documentation  Taken 1/11/2025 0800 by Camilo  Sveta, RN  Medication Review/Management: medications reviewed  Intervention: Promote Injury-Free Environment  Description: Provide a safe, barrier-free environment that encourages independent activity.  Keep care area uncluttered and well-lighted.  Determine need for increased observation or monitoring.  Avoid use of devices that minimize mobility, such as restraints or indwelling urinary catheter.  Recent Flowsheet Documentation  Taken 1/11/2025 1000 by Sveta Page, RN  Safety Promotion/Fall Prevention:   safety round/check completed   room organization consistent   nonskid shoes/slippers when out of bed   fall prevention program maintained   clutter free environment maintained   assistive device/personal items within reach   activity supervised  Taken 1/11/2025 0800 by Sveta Page, RN  Safety Promotion/Fall Prevention:   safety round/check completed   room organization consistent   nonskid shoes/slippers when out of bed   fall prevention program maintained   clutter free environment maintained   assistive device/personal items within reach   activity supervised     Problem: Seizure, Active Management  Goal: Absence of Seizure/Seizure-Related Injury  Outcome: Met     Problem: Communication Impairment  Goal: Effective Communication Skills  Outcome: Met     Problem: Dysrhythmia  Goal: Normalized Cardiac Rhythm  Outcome: Met

## 2025-01-11 NOTE — CASE MANAGEMENT/SOCIAL WORK
Case Management Discharge Note                Selected Continued Care - Admitted Since 1/9/2025       Destination    No services have been selected for the patient.                Durable Medical Equipment    No services have been selected for the patient.                Dialysis/Infusion    No services have been selected for the patient.                Home Medical Care    No services have been selected for the patient.                Therapy    No services have been selected for the patient.                Community Resources    No services have been selected for the patient.                [facility]    No services have been selected for the patient.                    Transportation Services  Private: Car    Final Discharge Disposition Code: 01 - home or self-care

## 2025-01-13 ENCOUNTER — TELEPHONE (OUTPATIENT)
Dept: TELEMETRY | Facility: HOSPITAL | Age: 55
End: 2025-01-13
Payer: COMMERCIAL

## 2025-01-13 LAB — LEVETIRACETAM SERPL-MCNC: 25 UG/ML (ref 10–40)

## 2025-01-30 DIAGNOSIS — I10 ESSENTIAL HYPERTENSION: Primary | ICD-10-CM

## 2025-01-30 DIAGNOSIS — G47.19 EXCESSIVE DAYTIME SLEEPINESS: ICD-10-CM

## 2025-01-30 DIAGNOSIS — I48.0 PAROXYSMAL ATRIAL FIBRILLATION: ICD-10-CM

## 2025-01-30 DIAGNOSIS — R06.89 GASPING FOR BREATH: ICD-10-CM

## 2025-01-30 DIAGNOSIS — R06.83 SNORING: ICD-10-CM

## 2025-02-07 NOTE — Clinical Note
10:22 AM  JOSE R received call from Andreia @ [facility]. Andreia stated that she will not have an open bed for pt until Monday due to 2 patients appealing their discharges. Andreia stated pt could go to [facility] if agreeable as they have beds open.   JOSE R will follow up.    1:24 PM  JOSE R met with pt and pt's son at bedside. JOSE R informed pt she was accepted to [facility]. Pt stated she did not know what JOSE R was talking about. JOSE R reviewed former CM note about pt being recommended for SNF and choosing [facility]. Pt stated she did not remember this still. JOSE R discussed if pt would like to go to SNF. Pt stated she plans to go home. JOSE R discussed with pt's son if he is able to help pt at home. Pt's son stated yes. JOSE R discussed possibly adding on HHC. Pt stated she no longer has health insurance. JOSE R stated pt has Medicare A & B on her chart. Pt stated that the insurance \"[id number] me, I don't know why.\" JOSE R stated she would contact benefits to find out.    JOSE R called [employer]. Afshan stated she would check pt's insurance.     3:07 PM  JOSE R called and spoke with [facility] [employer] [facility]. Afshan stated she verified pt's insurance and it is active for Medicare Part A.    JOSE R met with pt at bedside and updated about insurance information. JOSE R discussed DC plan. Pt stated she is agreeable to going home w/HHC. Pt stated she is open to [facility] referral for HHC.     JOSE R called and spoke with Zeinab @ [facility] to make HHC referral. Zeinab stated they would be able to accept pt. JOSE R informed pt likely won't DC until next week. Zeinab in agreement.     JOSE R called and spoke with Andreia @ [facility] and updated about pt's new DC plan. Andreia stated she will close pt out, but pt is able to be re-referred if she changes her mind.   [facility] EMERGENCY DEPARTMENT  [address] [address]  Phone: [phone]    Sheng Dominguez was seen and treated in our emergency department on 12/4/2024.  He may return to work on 12/07/2024.         Thank you for choosing [facility].    Dominic Street MD

## 2025-03-21 ENCOUNTER — OFFICE VISIT (OUTPATIENT)
Dept: CARDIOLOGY | Facility: CLINIC | Age: 55
End: 2025-03-21
Payer: COMMERCIAL

## 2025-03-21 VITALS
SYSTOLIC BLOOD PRESSURE: 150 MMHG | BODY MASS INDEX: 32.56 KG/M2 | HEIGHT: 72 IN | HEART RATE: 87 BPM | WEIGHT: 240.4 LBS | OXYGEN SATURATION: 95 % | DIASTOLIC BLOOD PRESSURE: 86 MMHG

## 2025-03-21 DIAGNOSIS — I48.0 PAROXYSMAL ATRIAL FIBRILLATION: Primary | Chronic | ICD-10-CM

## 2025-03-21 DIAGNOSIS — I10 ESSENTIAL HYPERTENSION: Chronic | ICD-10-CM

## 2025-03-21 DIAGNOSIS — R29.818 SUSPECTED SLEEP APNEA: ICD-10-CM

## 2025-03-21 RX ORDER — LOSARTAN POTASSIUM 50 MG/1
50 TABLET ORAL DAILY
Qty: 90 TABLET | Refills: 3 | Status: SHIPPED | OUTPATIENT
Start: 2025-03-21

## 2025-03-21 NOTE — PROGRESS NOTES
Cardiac Electrophysiology Outpatient Note  [facility] at [facility]    Office Visit     Sheng Dominguez  [id number]  03/21/2025    Primary Care Physician: Tracey Adam APRN    Referred By: No ref. provider found    Subjective     Chief Complaint   Patient presents with    Persistent atrial fibrillation     Problem List:  Atrial fibrillation  First diagnosed 8/26/2024 during colonoscopy  Echo 8/2024 reportedly with normal EF and no significant VHD-data deficit  Stress 9/17/2024 normal perfusion with Lexiscan Cardiolite, LVEF 56%, A-fib on baseline EKG, diffuse multivessel CAC noted  PVA with PFA 12/2024 Dr. George, posterior wall isolation, typical CTI dependent atrial flutter ablation  TTE 1/2025: LVEF 4%, normal diastolic function, no VHD, negative bubble study  Seizure disorder  Myoclonus on gabapentin, now discontinued  Hypertension  Dyslipidemia  Memory impairment  Anxiety      1. Successful pulsed field ablation (PFA) with isolation of the pulmonary veins  2.  Successful posterior wall isolation  3.  Successful ablation of typical CTI dependent atrial flutter.    History of Present Illness:   Sheng Dominguez is a 54 y.o. male who presents to my electrophysiology clinic for follow up of persistent atrial fibrillation and hypertension.  He underwent ablation in December consisting of pulmonary vein isolation, posterior wall isolation, and typical CTI dependent atrial flutter ablation.  Since the procedure, he had worsening myoclonus and was admitted was found to be due to gabapentin which was discontinued.  Prior to the ablation when he is out of rhythm he would feel palpitations, fatigue, dizziness and shortness of breath.  He has had no more of the symptoms.  Insurance reportedly denied his sleep study and I recommended reaching back up to his sleep medicine group for recoverable solution.  His only complaint is daytime somnolence and does report erectile dysfunction.    Past  Medical History:   Diagnosis Date    Anxiety     Arrhythmia     Arthritis     Asthma     Atrial fibrillation     GERD (gastroesophageal reflux disease)     Hearing loss     Hyperlipidemia     Hypertension     Low back pain     Muscle pain     Peptic ulceration     Seizures     Started as child       Past Surgical History:   Procedure Laterality Date    ABLATION OF DYSRHYTHMIC FOCUS  12/2/2024    CARDIAC ELECTROPHYSIOLOGY PROCEDURE N/A 12/02/2024    Procedure: Ablation atrial fibrillation with PFA with carto; CTA prior; hold eliquis morning of procedure; no other meds to hold;  Surgeon: Thomas George MD;  Location: [facility] INVASIVE LOCATION;  Service: Cardiovascular;  Laterality: N/A;    CYSTECTOMY      BREAST AREA    HAND SURGERY      SMALL INTESTINE SURGERY      TONSILLECTOMY         Family History   Problem Relation Age of Onset    Hypertension Mother     Cancer Father     Heart disease Sister     Heart disease Brother     Heart disease Maternal Grandmother     Heart disease Maternal Grandfather     Cancer Paternal Grandmother     Cancer Paternal Grandfather     Heart disease Maternal Aunt     Arrhythmia Brother     Cancer Brother     Cancer Sister        Social History     Socioeconomic History    Marital status:    Tobacco Use    Smoking status: Never     Passive exposure: Never    Smokeless tobacco: Never   Vaping Use    Vaping status: Never Used    Passive vaping exposure: Yes   Substance and Sexual Activity    Alcohol use: Yes     Alcohol/week: 10.0 standard drinks of alcohol     Types: 10 Cans of beer per week     Comment: socially    Drug use: Never    Sexual activity: Yes     Partners: Female     Birth control/protection: None         Current Outpatient Medications:     albuterol sulfate  (90 Base) MCG/ACT inhaler, Inhale 2 puffs Every 4 (Four) Hours As Needed for Wheezing or Shortness of Air., Disp: 8 g, Rfl: 0    ALPRAZolam (XANAX) 1 MG tablet, Take 1 tablet by mouth 3 (Three) Times a  Day., Disp: , Rfl:     apixaban (ELIQUIS) 5 MG tablet tablet, Take 1 tablet by mouth Every 12 (Twelve) Hours., Disp: 60 tablet, Rfl: 6    aspirin 81 MG EC tablet, Take 1 tablet by mouth Daily., Disp: 90 tablet, Rfl: 3    atorvastatin (LIPITOR) 20 MG tablet, Take 1 tablet by mouth Every Night., Disp: 90 tablet, Rfl: 3    cetirizine (zyrTEC) 5 MG tablet, Take 1 tablet by mouth Daily As Needed for Allergies., Disp: , Rfl:     coenzyme Q10 50 MG capsule capsule, Take 1 capsule by mouth Daily., Disp: , Rfl:     divalproex (DEPAKOTE) 500 MG DR tablet, Take 2 tablets by mouth 2 (Two) Times a Day., Disp: , Rfl:     fluticasone (FLONASE) 50 MCG/ACT nasal spray, , Disp: , Rfl:     Fluticasone-Salmeterol (ADVAIR/WIXELA) 250-50 MCG/ACT DISKUS, , Disp: , Rfl:     hyoscyamine (ANASPAZ,LEVSIN) 0.125 MG tablet, Take 1 tablet by mouth Every 6 (Six) Hours As Needed for Cramping or Diarrhea., Disp: , Rfl:     ipratropium (ATROVENT) 0.06 % nasal spray, , Disp: , Rfl:     levETIRAcetam (KEPPRA) 1000 MG tablet, Take 1 tablet by mouth 2 (Two) Times a Day. TAKES 1.5 TABLETS AT NIGHT, Disp: , Rfl:     Magnesium Oxide -Mg Supplement 400 (240 Mg) MG tablet, Take 1 tablet by mouth Daily., Disp: , Rfl:     metoprolol tartrate (LOPRESSOR) 25 MG tablet, Take 1 tablet by mouth 2 (Two) Times a Day., Disp: , Rfl:     montelukast (SINGULAIR) 10 MG tablet, , Disp: , Rfl:     Omega-3 1000 MG capsule, Take 1 capsule by mouth Daily., Disp: , Rfl:     pantoprazole (PROTONIX) 40 MG EC tablet, Take 1 tablet by mouth Daily., Disp: , Rfl:     traZODone (DESYREL) 300 MG tablet, Take 0.5 tablets by mouth Every Night., Disp: , Rfl:     vitamin C (ASCORBIC ACID) 250 MG tablet, Take 4 tablets by mouth Daily., Disp: , Rfl:     DULoxetine (CYMBALTA) 30 MG capsule, Take 1 capsule by mouth Daily for 30 days., Disp: 30 capsule, Rfl: 0    losartan (COZAAR) 50 MG tablet, Take 1 tablet by mouth Daily., Disp: 90 tablet, Rfl: 3    Testosterone 1.62 % gel, Apply  topically  "Daily. ONCE DAILY (Patient not taking: Reported on 3/21/2025), Disp: , Rfl:     Allergies:   No Known Allergies    Objective   Vital Signs: Blood pressure 150/86, pulse 87, height 182.9 cm (72\"), weight 109 kg (240 lb 6.4 oz), SpO2 95%.    PHYSICAL EXAM  General appearance: Awake, alert, cooperative  Head: Normocephalic, without obvious abnormality, atraumatic  Lungs: Clear to ascultation bilaterally  Heart: Regular rate and rhythm, no murmurs, no lower extremity swelling  Skin: Skin color, turgor normal, no rashes or lesions  Neurologic: Grossly normal     Lab Results   Component Value Date    GLUCOSE 120 (H) 01/10/2025    CALCIUM 9.3 01/10/2025     (H) 01/10/2025    K 4.1 01/10/2025    CO2 26.4 01/10/2025     (H) 01/10/2025    BUN 12 01/10/2025    CREATININE 0.78 01/10/2025    BCR 15.4 01/10/2025    ANIONGAP 9.6 01/10/2025     Lab Results   Component Value Date    WBC 4.70 01/10/2025    HGB 12.9 (L) 01/10/2025    HCT 38.0 01/10/2025    MCV 96.9 01/10/2025     01/10/2025     Lab Results   Component Value Date    INR 1.16 (H) 01/09/2025    PROTIME 15.3 (H) 01/09/2025     Lab Results   Component Value Date    TSH 1.500 01/09/2025          Results for orders placed during the hospital encounter of 01/09/25    Adult Transthoracic Echo Complete W/ Cont if Necessary Per Protocol (With Agitated Saline)    Interpretation Summary  1.  Normal left ventricular size and systolic function, 3D EF 64%.  2.  Normal LV diastolic filling pattern.  3.  Normal right ventricular size and systolic function.  4.  Normal left atrial volume index.  5.  Mild tricuspid regurgitation.  6.  Negative bubble study.         I personally viewed and interpreted the patient's EKG/Telemetry/lab data      ECG 12 Lead    Date/Time: 3/21/2025 4:22 PM  Performed by: Eduard Dietz PA-C    Authorized by: Eduard Dietz PA-C  Comparison: compared with previous ECG from 1/9/2025  Similar to previous ECG  Rhythm: sinus " rhythm  Conduction: incomplete right bundle branch block    Clinical impression: abnormal EKG          Sheng Dominguez  reports that he has never smoked. He has never been exposed to tobacco smoke. He has never used smokeless tobacco.    Advance Care Planning   Advance Care Planning: ACP discussion was declined by the patient. Patient does not have an advance directive, information provided.     Assessment & Plan    1. Paroxysmal atrial fibrillation  PVA with PFA 12/2024 Dr. George, posterior wall isolation, typical CTI dependent atrial flutter ablation  No recurrence of symptomatic atrial fibrillation where he was previously very symptomatic when out of rhythm  Continue Eliquis for stroke prophylaxis  Patient is worried that his metoprolol is causing fatigue and erectile dysfunction.  We will stop it for now    2. Essential hypertension  Stopping metoprolol as mentioned above.  Adding losartan 50 mg daily for better control.  Patient will get follow-up BMP in 10 days.  - Basic Metabolic Panel; Future    3.  Suspected sleep apnea  He does report daytime somnolence which very well could be from his various medical regimen, but his BMI is greater than 30 and he has snoring.  We previously referred him to sleep medicine but this appointment never came to fruition for what ever reason.  We will rerefer him back.  Diagnosing and treating sleep apnea is crucial in her efforts to prevent atrial fibrillation going forward.    Follow Up:  3 months      Thank you for allowing me to participate in the care of your patient. Please do not hesitate to contact me with additional questions or concerns.      Eduard Dietz PA-C  Cardiac Electrophysiology  [facility] / [facility]

## 2025-03-24 DIAGNOSIS — R40.0 DAYTIME SOMNOLENCE: ICD-10-CM

## 2025-03-24 DIAGNOSIS — R29.818 SUSPECTED SLEEP APNEA: Primary | ICD-10-CM

## 2025-03-24 NOTE — PROGRESS NOTES
Eduard Dietz PA-C Edelen, Justina, DAMARIS  Can you refer this patient to sleep medicine wherever convenient to his location?  I think we referred him previously but no one ever reached out to.  Thank you .  Diagnosis suspected sleep apnea and daytime somnolence

## 2025-03-31 ENCOUNTER — TELEPHONE (OUTPATIENT)
Dept: CARDIOLOGY | Facility: CLINIC | Age: 55
End: 2025-03-31
Payer: COMMERCIAL

## 2025-03-31 NOTE — TELEPHONE ENCOUNTER
Patient called and states he saw Eduard Dietz PA-C on 03/21/2025. He states at this visit he discussed that he was experiencing erectile dysfunction and Eduard recommended he reach out to PCP regarding this but if they would not prescribe the medication he would assist with this.     Patient states he saw his PCP and she would not prescribe the medication due to his cardiovascular issues.     Patient is requesting a prescription for erectile dysfunction medication. Please advise.

## 2025-04-02 RX ORDER — TADALAFIL 10 MG/1
10 TABLET ORAL DAILY PRN
Qty: 60 TABLET | Refills: 0 | Status: SHIPPED | OUTPATIENT
Start: 2025-04-02

## 2025-06-17 ENCOUNTER — TELEPHONE (OUTPATIENT)
Dept: CARDIOLOGY | Facility: CLINIC | Age: 55
End: 2025-06-17
Payer: COMMERCIAL

## 2025-06-17 NOTE — TELEPHONE ENCOUNTER
Name: Holly Dominguez    Relationship: Emergency Contact    Best Callback Number: [phone]    Incoming call to the Hub, requesting to  Reschedule their Other: PVA appointment on 06.20.25.     Per Hub workflow, further review is needed before the task can be completed.    Result of Call: Please reach out to the patient to reschedule

## 2025-06-17 NOTE — TELEPHONE ENCOUNTER
Patient states that Cialis 10 mg PRN is not strong enough. It is not working. He would like to know if he could have a stronger dose or different medication?

## 2025-06-18 NOTE — TELEPHONE ENCOUNTER
Lvm for patient to return call to get updated insurance info. Hub can update insurance if patient calls back in, thank you.

## 2025-07-22 ENCOUNTER — OFFICE VISIT (OUTPATIENT)
Dept: CARDIOLOGY | Facility: CLINIC | Age: 55
End: 2025-07-22
Payer: COMMERCIAL

## 2025-07-22 VITALS
SYSTOLIC BLOOD PRESSURE: 124 MMHG | HEART RATE: 60 BPM | OXYGEN SATURATION: 95 % | HEIGHT: 72 IN | BODY MASS INDEX: 33.24 KG/M2 | WEIGHT: 245.4 LBS | DIASTOLIC BLOOD PRESSURE: 70 MMHG

## 2025-07-22 DIAGNOSIS — R29.818 SUSPECTED SLEEP APNEA: ICD-10-CM

## 2025-07-22 DIAGNOSIS — I48.0 PAROXYSMAL ATRIAL FIBRILLATION: Primary | Chronic | ICD-10-CM

## 2025-07-22 DIAGNOSIS — N52.8 OTHER MALE ERECTILE DYSFUNCTION: ICD-10-CM

## 2025-07-22 DIAGNOSIS — I10 ESSENTIAL HYPERTENSION: Chronic | ICD-10-CM

## 2025-07-22 RX ORDER — SUCRALFATE 1 G/1
TABLET ORAL
COMMUNITY
Start: 2025-06-08

## 2025-07-22 RX ORDER — GABAPENTIN 600 MG/1
600 TABLET ORAL 3 TIMES DAILY
COMMUNITY

## 2025-07-22 NOTE — PROGRESS NOTES
Cardiac Electrophysiology Outpatient Note  [facility] at [facility]    Office Visit     Sheng Dominguez  [id number]  03/21/2025    Primary Care Physician: Tracey Adam APRN    Referred By: No ref. provider found    Subjective     Chief Complaint   Patient presents with    Persistent atrial fibrillation     C/O Mild Fatigue.     Problem List:  Atrial fibrillation  First diagnosed 8/26/2024 during colonoscopy  Echo 8/2024 reportedly with normal EF and no significant VHD-data deficit  Stress 9/17/2024 normal perfusion with Lexiscan Cardiolite, LVEF 56%, A-fib on baseline EKG, diffuse multivessel CAC noted  PVA with PFA 12/2024 Dr. George, posterior wall isolation, typical CTI dependent atrial flutter ablation  TTE 1/2025: LVEF 4%, normal diastolic function, no VHD, negative bubble study  Seizure disorder  Myoclonus on gabapentin, now discontinued  Hypertension  Dyslipidemia  Memory impairment  Anxiety        History of Present Illness:   Sheng Dominguez is a 54 y.o. male who presents to my electrophysiology clinic for follow up of persistent atrial fibrillation and hypertension.  He underwent ablation in December consisting of pulmonary vein isolation, posterior wall isolation, and typical CTI dependent atrial flutter ablation.  Prior to the ablation when he is out of rhythm he would feel palpitations, fatigue, dizziness and shortness of breath.        Since we last saw the patient in March 2025, he has had no symptoms concerning for atrial fibrillation recurrence mentioned above.  He continues to have erectile dysfunction.  I prescribed him Cialis which he says was ineffective.  He still has not had a sleep study as insurance reportedly denied this    Past Medical History:   Diagnosis Date    Anxiety     Arrhythmia     Arthritis     Asthma     Atrial fibrillation     GERD (gastroesophageal reflux disease)     Hearing loss     Hyperlipidemia     Hypertension     Low back pain      Muscle pain     Peptic ulceration     Seizures     Started as child    White matter disease        Past Surgical History:   Procedure Laterality Date    ABLATION OF DYSRHYTHMIC FOCUS  12/2/2024    CARDIAC ELECTROPHYSIOLOGY PROCEDURE N/A 12/02/2024    Procedure: Ablation atrial fibrillation with PFA with carto; CTA prior; hold eliquis morning of procedure; no other meds to hold;  Surgeon: Thomas George MD;  Location: [facility] INVAS[facility] LOC[facility];  Service: Cardiovascular;  Laterality: N/A;    CYSTECTOMY      BREAST AREA    HAND SURGERY      SMALL INTESTINE SURGERY      TONSILLECTOMY         Family History   Problem Relation Age of Onset    Hypertension Mother     Cancer Father     Heart disease Sister     Heart disease Brother     Heart disease Maternal Grandmother     Heart disease Maternal Grandfather     Cancer Paternal Grandmother     Cancer Paternal Grandfather     Heart disease Maternal Aunt     Arrhythmia Brother     Cancer Brother     Cancer Sister        Social History     Socioeconomic History    Marital status:    Tobacco Use    Smoking status: Never     Passive exposure: Never    Smokeless tobacco: Never   Vaping Use    Vaping status: Never Used    Passive vaping exposure: Yes   Substance and Sexual Activity    Alcohol use: Yes     Alcohol/week: 10.0 standard drinks of alcohol     Types: 10 Cans of beer per week     Comment: socially    Drug use: Never    Sexual activity: Yes     Partners: Female     Birth control/protection: None         Current Outpatient Medications:     albuterol sulfate  (90 Base) MCG/ACT inhaler, Inhale 2 puffs Every 4 (Four) Hours As Needed for Wheezing or Shortness of Air., Disp: 8 g, Rfl: 0    ALPRAZolam (XANAX) 1 MG tablet, Take 1 tablet by mouth 3 (Three) Times a Day., Disp: , Rfl:     apixaban (ELIQUIS) 5 MG tablet tablet, Take 1 tablet by mouth Every 12 (Twelve) Hours., Disp: 60 tablet, Rfl: 6    aspirin 81 MG EC tablet, Take 1 tablet by mouth Daily., Disp: 90  tablet, Rfl: 3    atorvastatin (LIPITOR) 20 MG tablet, Take 1 tablet by mouth Every Night., Disp: 90 tablet, Rfl: 3    cetirizine (zyrTEC) 5 MG tablet, Take 1 tablet by mouth Daily As Needed for Allergies., Disp: , Rfl:     coenzyme Q10 50 MG capsule capsule, Take 1 capsule by mouth Daily., Disp: , Rfl:     divalproex (DEPAKOTE) 500 MG DR tablet, Take 2 tablets by mouth 2 (Two) Times a Day., Disp: , Rfl:     fluticasone (FLONASE) 50 MCG/ACT nasal spray, , Disp: , Rfl:     Fluticasone-Salmeterol (ADVAIR/WIXELA) 250-50 MCG/ACT DISKUS, , Disp: , Rfl:     gabapentin (NEURONTIN) 600 MG tablet, Take 1 tablet by mouth 3 (Three) Times a Day., Disp: , Rfl:     hyoscyamine (ANASPAZ,LEVSIN) 0.125 MG tablet, Take 1 tablet by mouth Every 6 (Six) Hours As Needed for Cramping or Diarrhea., Disp: , Rfl:     ipratropium (ATROVENT) 0.06 % nasal spray, , Disp: , Rfl:     levETIRAcetam (KEPPRA) 1000 MG tablet, Take 1 tablet by mouth 2 (Two) Times a Day. TAKES 1.5 TABLETS AT NIGHT, Disp: , Rfl:     losartan (COZAAR) 50 MG tablet, Take 1 tablet by mouth Daily., Disp: 90 tablet, Rfl: 3    Magnesium Oxide -Mg Supplement 400 (240 Mg) MG tablet, Take 1 tablet by mouth Daily., Disp: , Rfl:     montelukast (SINGULAIR) 10 MG tablet, , Disp: , Rfl:     Omega-3 1000 MG capsule, Take 1 capsule by mouth Daily., Disp: , Rfl:     pantoprazole (PROTONIX) 40 MG EC tablet, Take 1 tablet by mouth Daily., Disp: , Rfl:     sucralfate (CARAFATE) 1 g tablet, TAKE 1 TABLET BY MOUTH UP TO 4 TIMES DAILY AS DIRECTED, Disp: , Rfl:     tadalafil (Cialis) 10 MG tablet, Take 1 tablet by mouth Daily As Needed for Erectile Dysfunction., Disp: 60 tablet, Rfl: 0    traZODone (DESYREL) 300 MG tablet, Take 0.5 tablets by mouth Every Night., Disp: , Rfl:     vitamin C (ASCORBIC ACID) 250 MG tablet, Take 4 tablets by mouth Daily., Disp: , Rfl:     DULoxetine (CYMBALTA) 30 MG capsule, Take 1 capsule by mouth Daily for 30 days., Disp: 30 capsule, Rfl: 0    Testosterone 1.62 %  "gel, Apply  topically Daily. ONCE DAILY (Patient not taking: Reported on 7/22/2025), Disp: , Rfl:     Allergies:   No Known Allergies    Objective   Vital Signs: Blood pressure 124/70, pulse 60, height 182.9 cm (72\"), weight 111 kg (245 lb 6.4 oz), SpO2 95%.    PHYSICAL EXAM  General appearance: Awake, alert, cooperative  Head: Normocephalic, without obvious abnormality, atraumatic  Lungs: Clear to ascultation bilaterally  Heart: Regular rate and rhythm, no murmurs, no lower extremity swelling  Skin: Skin color, turgor normal, no rashes or lesions  Neurologic: Grossly normal     Lab Results   Component Value Date    GLUCOSE 120 (H) 01/10/2025    CALCIUM 9.3 01/10/2025     (H) 01/10/2025    K 4.1 01/10/2025    CO2 26.4 01/10/2025     (H) 01/10/2025    BUN 12 01/10/2025    CREATININE 0.78 01/10/2025    BCR 15.4 01/10/2025    ANIONGAP 9.6 01/10/2025     Lab Results   Component Value Date    WBC 4.70 01/10/2025    HGB 12.9 (L) 01/10/2025    HCT 38.0 01/10/2025    MCV 96.9 01/10/2025     01/10/2025     Lab Results   Component Value Date    INR 1.16 (H) 01/09/2025    PROTIME 15.3 (H) 01/09/2025     Lab Results   Component Value Date    TSH 1.500 01/09/2025          Results for orders placed during the hospital encounter of 01/09/25    Adult Transthoracic Echo Complete W/ Cont if Necessary Per Protocol (With Agitated Saline) 01/10/2025 11:38 AM    Interpretation Summary  1.  Normal left ventricular size and systolic function, 3D EF 64%.  2.  Normal LV diastolic filling pattern.  3.  Normal right ventricular size and systolic function.  4.  Normal left atrial volume index.  5.  Mild tricuspid regurgitation.  6.  Negative bubble study.         I personally viewed and interpreted the patient's EKG/Telemetry/lab data      ECG 12 Lead    Date/Time: 7/25/2025 2:50 PM  Performed by: Eduard Dietz PA-C    Authorized by: Eduard Dietz PA-C  Comparison: compared with previous ECG from 3/21/2025  Similar to " previous ECG  Rhythm: sinus rhythm  Rate: normal  BPM: 60    Clinical impression: normal ECG          Sheng Dominguez  reports that he has never smoked. He has never been exposed to tobacco smoke. He has never used smokeless tobacco.    Advance Care Planning   Advance Care Planning: ACP discussion was declined by the patient. Patient does not have an advance directive, information provided.     Assessment & Plan    1. Paroxysmal atrial fibrillation  PVA with PFA 12/2024 Dr. George, posterior wall isolation, typical CTI dependent atrial flutter ablation  No recurrence of symptomatic atrial fibrillation where he was previously very symptomatic when out of rhythm  Continue Eliquis for stroke prophylaxis    2. Essential hypertension  BP control in the office today 124/70.  Continue current antihypertensive monotherapy with losartan    3.  Suspected sleep apnea  Stop bang score 5  -I will reach out to his prior sleep medicine provider to see if there is anything we can do to get a polysomnography performed affordably.    4.  Erectile dysfunction  Refractory to Cialis.  We will refer to urology as this is becoming a significant detriment to quality of life    Follow Up:  3 months      Thank you for allowing me to participate in the care of your patient. Please do not hesitate to contact me with additional questions or concerns.      Eduard Dietz PA-C  Cardiac Electrophysiology  [facility] / [facility]

## 2025-07-22 NOTE — PROGRESS NOTES
Cardiac Electrophysiology Outpatient Note  [facility] at [facility]    Office Visit     Sheng Dominguez  [id number]  07/22/2025    Primary Care Physician: Tracey Adam APRN    Referred By: No ref. provider found    Subjective     Chief Complaint   Patient presents with    Persistent atrial fibrillation     C/O Mild Fatigue.     Problem List:  Atrial fibrillation  First diagnosed 8/26/2024 during colonoscopy  Echo 8/2024 reportedly with normal EF and no significant VHD-data deficit  Stress 9/17/2024 normal perfusion with Lexiscan Cardiolite, LVEF 56%, A-fib on baseline EKG, diffuse multivessel CAC noted  PVA with PFA 12/2024 Dr. 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 male who presents to my electrophysiology clinic for follow up of ***.      Past Medical History:   Diagnosis Date    Anxiety     Arrhythmia     Arthritis     Asthma     Atrial fibrillation     GERD (gastroesophageal reflux disease)     Hearing loss     Hyperlipidemia     Hypertension     Low back pain     Muscle pain     Peptic ulceration     Seizures     Started as child    White matter disease        Past Surgical History:   Procedure Laterality Date    ABLATION OF DYSRHYTHMIC FOCUS  12/2/2024    CARDIAC ELECTROPHYSIOLOGY PROCEDURE N/A 12/02/2024    Procedure: Ablation atrial fibrillation with PFA with carto; CTA prior; hold eliquis morning of procedure; no other meds to hold;  Surgeon: Thomas George MD;  Location: [facility] INVASIVE LOCATION;  Service: Cardiovascular;  Laterality: N/A;    CYSTECTOMY      BREAST AREA    HAND SURGERY      SMALL INTESTINE SURGERY      TONSILLECTOMY         Family  History   Problem Relation Age of Onset    Hypertension Mother     Cancer Father     Heart disease Sister     Heart disease Brother     Heart disease Maternal Grandmother     Heart disease Maternal Grandfather     Cancer Paternal Grandmother     Cancer Paternal Grandfather     Heart disease Maternal Aunt     Arrhythmia Brother     Cancer Brother     Cancer Sister        Social History     Socioeconomic History    Marital status:    Tobacco Use    Smoking status: Never     Passive exposure: Never    Smokeless tobacco: Never   Vaping Use    Vaping status: Never Used    Passive vaping exposure: Yes   Substance and Sexual Activity    Alcohol use: Yes     Alcohol/week: 10.0 standard drinks of alcohol     Types: 10 Cans of beer per week     Comment: socially    Drug use: Never    Sexual activity: Yes     Partners: Female     Birth control/protection: None         Current Outpatient Medications:     albuterol sulfate  (90 Base) MCG/ACT inhaler, Inhale 2 puffs Every 4 (Four) Hours As Needed for Wheezing or Shortness of Air., Disp: 8 g, Rfl: 0    ALPRAZolam (XANAX) 1 MG tablet, Take 1 tablet by mouth 3 (Three) Times a Day., Disp: , Rfl:     apixaban (ELIQUIS) 5 MG tablet tablet, Take 1 tablet by mouth Every 12 (Twelve) Hours., Disp: 60 tablet, Rfl: 6    aspirin 81 MG EC tablet, Take 1 tablet by mouth Daily., Disp: 90 tablet, Rfl: 3    atorvastatin (LIPITOR) 20 MG tablet, Take 1 tablet by mouth Every Night., Disp: 90 tablet, Rfl: 3    cetirizine (zyrTEC) 5 MG tablet, Take 1 tablet by mouth Daily As Needed for Allergies., Disp: , Rfl:     coenzyme Q10 50 MG capsule capsule, Take 1 capsule by mouth Daily., Disp: , Rfl:     divalproex (DEPAKOTE) 500 MG DR tablet, Take 2 tablets by mouth 2 (Two) Times a Day., Disp: , Rfl:     fluticasone (FLONASE) 50 MCG/ACT nasal spray, , Disp: , Rfl:     Fluticasone-Salmeterol (ADVAIR/WIXELA) 250-50 MCG/ACT DISKUS, , Disp: , Rfl:     gabapentin (NEURONTIN) 600 MG tablet, Take 1  "tablet by mouth 3 (Three) Times a Day., Disp: , Rfl:     hyoscyamine (ANASPAZ,LEVSIN) 0.125 MG tablet, Take 1 tablet by mouth Every 6 (Six) Hours As Needed for Cramping or Diarrhea., Disp: , Rfl:     ipratropium (ATROVENT) 0.06 % nasal spray, , Disp: , Rfl:     levETIRAcetam (KEPPRA) 1000 MG tablet, Take 1 tablet by mouth 2 (Two) Times a Day. TAKES 1.5 TABLETS AT NIGHT, Disp: , Rfl:     losartan (COZAAR) 50 MG tablet, Take 1 tablet by mouth Daily., Disp: 90 tablet, Rfl: 3    Magnesium Oxide -Mg Supplement 400 (240 Mg) MG tablet, Take 1 tablet by mouth Daily., Disp: , Rfl:     montelukast (SINGULAIR) 10 MG tablet, , Disp: , Rfl:     Omega-3 1000 MG capsule, Take 1 capsule by mouth Daily., Disp: , Rfl:     pantoprazole (PROTONIX) 40 MG EC tablet, Take 1 tablet by mouth Daily., Disp: , Rfl:     sucralfate (CARAFATE) 1 g tablet, TAKE 1 TABLET BY MOUTH UP TO 4 TIMES DAILY AS DIRECTED, Disp: , Rfl:     tadalafil (Cialis) 10 MG tablet, Take 1 tablet by mouth Daily As Needed for Erectile Dysfunction., Disp: 60 tablet, Rfl: 0    traZODone (DESYREL) 300 MG tablet, Take 0.5 tablets by mouth Every Night., Disp: , Rfl:     vitamin C (ASCORBIC ACID) 250 MG tablet, Take 4 tablets by mouth Daily., Disp: , Rfl:     DULoxetine (CYMBALTA) 30 MG capsule, Take 1 capsule by mouth Daily for 30 days., Disp: 30 capsule, Rfl: 0    Testosterone 1.62 % gel, Apply  topically Daily. ONCE DAILY (Patient not taking: Reported on 7/22/2025), Disp: , Rfl:     Allergies:   No Known Allergies    Objective   Vital Signs: Blood pressure 124/70, pulse 60, height 182.9 cm (72\"), weight 111 kg (245 lb 6.4 oz), SpO2 95%.    PHYSICAL EXAM  General appearance: Awake, alert, cooperative  Head: Normocephalic, without obvious abnormality, atraumatic  Neck: No JVD  Lungs: Clear to ascultation bilaterally  Heart: Regular rate and rhythm, no murmurs, 2+ LE pulses, no lower extremity swelling  Skin: Skin color, turgor normal, no rashes or lesions  Neurologic: Grossly " "normal     Lab Results   Component Value Date    GLUCOSE 120 (H) 01/10/2025    CALCIUM 9.3 01/10/2025     (H) 01/10/2025    K 4.1 01/10/2025    CO2 26.4 01/10/2025     (H) 01/10/2025    BUN 12 01/10/2025    CREATININE 0.78 01/10/2025    BCR 15.4 01/10/2025    ANIONGAP 9.6 01/10/2025     Lab Results   Component Value Date    WBC 4.70 01/10/2025    HGB 12.9 (L) 01/10/2025    HCT 38.0 01/10/2025    MCV 96.9 01/10/2025     01/10/2025     Lab Results   Component Value Date    INR 1.16 (H) 01/09/2025    PROTIME 15.3 (H) 01/09/2025     Lab Results   Component Value Date    TSH 1.500 01/09/2025          Results for orders placed during the hospital encounter of 01/09/25    Adult Transthoracic Echo Complete W/ Cont if Necessary Per Protocol (With Agitated Saline) 01/10/2025 11:38 AM    Interpretation Summary  1.  Normal left ventricular size and systolic function, 3D EF 64%.  2.  Normal LV diastolic filling pattern.  3.  Normal right ventricular size and systolic function.  4.  Normal left atrial volume index.  5.  Mild tricuspid regurgitation.  6.  Negative bubble study.         I personally viewed and interpreted the patient's EKG/Telemetry/lab data    Procedures    Sheng Dominguez  reports that he has never smoked. He has never been exposed to tobacco smoke. He has never used smokeless tobacco. I have educated him on the risk of diseases from using tobacco products such as {Tobacco Cessation Diseases:63767::\"cancer\",\"COPD\",\"heart disease\"}.     I advised him to quit and he is {Willing/Not Willing to Quit Tobacco Products:81908}.    I spent {Time Spent Tobacco :38794} minutes counseling the patient.           Advance Care Planning   Advance Care Planning: {Advance Directive Status:52330}     Assessment & Plan    There are no diagnoses linked to this encounter.     Follow Up:  No follow-ups on file.      Thank you for allowing me to participate in the care of your patient. Please do not hesitate to contact " me with additional questions or concerns.      Thomas George M.D.  Cardiac Electrophysiologist  [facility]

## 2025-07-25 PROBLEM — N52.8 OTHER MALE ERECTILE DYSFUNCTION: Status: ACTIVE | Noted: 2025-07-25

## 2025-07-28 DIAGNOSIS — I48.0 PAROXYSMAL ATRIAL FIBRILLATION: ICD-10-CM

## 2025-07-28 DIAGNOSIS — I10 ESSENTIAL HYPERTENSION: Primary | ICD-10-CM

## 2025-07-28 DIAGNOSIS — G47.19 EXCESSIVE DAYTIME SLEEPINESS: ICD-10-CM

## 2025-07-28 DIAGNOSIS — R06.89 GASPING FOR BREATH: ICD-10-CM

## 2025-07-28 RX ORDER — APIXABAN 5 MG/1
5 TABLET, FILM COATED ORAL EVERY 12 HOURS SCHEDULED
Qty: 180 TABLET | Refills: 0 | Status: SHIPPED | OUTPATIENT
Start: 2025-07-28

## 2025-08-11 ENCOUNTER — OFFICE VISIT (OUTPATIENT)
Age: 55
End: 2025-08-11
Payer: COMMERCIAL

## 2025-08-11 DIAGNOSIS — N52.01 ERECTILE DYSFUNCTION DUE TO ARTERIAL INSUFFICIENCY: ICD-10-CM

## 2025-08-11 DIAGNOSIS — R79.89 LOW TESTOSTERONE IN MALE: Primary | ICD-10-CM

## 2025-08-11 DIAGNOSIS — Z12.5 PROSTATE CANCER SCREENING: ICD-10-CM

## 2025-08-12 ENCOUNTER — TELEPHONE (OUTPATIENT)
Age: 55
End: 2025-08-12
Payer: COMMERCIAL

## 2025-08-12 DIAGNOSIS — N52.01 ERECTILE DYSFUNCTION DUE TO ARTERIAL INSUFFICIENCY: Primary | ICD-10-CM

## 2025-08-12 RX ORDER — TADALAFIL 20 MG/1
20 TABLET ORAL AS NEEDED
Qty: 20 TABLET | Refills: 5 | Status: SHIPPED | OUTPATIENT
Start: 2025-08-12

## (undated) DEVICE — ELECTRD RETRN/GRND MEGADYNE SGL/PLT W/CORD 9FT DISP

## (undated) DEVICE — SYS CLS VASC/VENI VASCADE MVP 6TO12F

## (undated) DEVICE — PINNACLE INTRODUCER SHEATH: Brand: PINNACLE

## (undated) DEVICE — DECANT BG O JET

## (undated) DEVICE — DRSNG SURESITE123 4X4.8IN

## (undated) DEVICE — ST INF PRI SMRTSTE 20DRP 2VLV 24ML 117

## (undated) DEVICE — NDL PERC 1PART ECHOTIP WO/BASEPLT 18G 7CM

## (undated) DEVICE — SYS CLS VASC/VENI VASCADE/MVP 10TO12F XL

## (undated) DEVICE — STERILE (15.2 TAPERED TO 7.6 X 183CM) POLYETHYLENE ACCORDION-FOLDED COVER FOR USE WITH SIEMENS ACUNAV ULTRASOUND CATHETER FAMILY CONNECTOR: Brand: SWIFTLINK TRANSDUCER COVER

## (undated) DEVICE — Device: Brand: WEBSTER CS

## (undated) DEVICE — ADULT, W/LG. BACK PAD, RADIOTRANSPARENT ELEMENT AND LEAD WIRE COMPATIBLE W/: Brand: DEFIBRILLATION ELECTRODES

## (undated) DEVICE — LEX ELECTRO PHYSIOLOGY: Brand: MEDLINE INDUSTRIES, INC.

## (undated) DEVICE — TBG PRESS MON 48IN M/F L/L: Brand: MEDLINE INDUSTRIES, INC.

## (undated) DEVICE — ST EXT IV SMARTSITE PINCH/CLMP 5ML 46CM

## (undated) DEVICE — Device: Brand: SOUNDSTAR

## (undated) DEVICE — 1 X VERSACROSS TRANSSEPTAL SHEATH (INCLUDING  1 X J-TIP GUIDEWIRE); 1 X VERSACROSS RF WIRE (INCLUDING 1 X CONNECTOR CABLE (SINGLE USE)); 1 X DISPERSIVE ELECTRODE: Brand: VERSACROSS ACCESS SOLUTION

## (undated) DEVICE — PRESSURE MONITORING SET: Brand: TRUWAVE

## (undated) DEVICE — ST EXT IV SMRTSTE 2VLV FIX M LL 6ML 41

## (undated) DEVICE — Device: Brand: PENTARAY NAV

## (undated) DEVICE — KT MANIFLD EP

## (undated) DEVICE — GW INQWIRE ROSEN/TIP PTFE FC J/TP/1.5MM 0.035IN 180CM

## (undated) DEVICE — SOL NACL 0.9PCT 1000ML

## (undated) DEVICE — PAD GRND REM POLYHESIVE A/ DISP

## (undated) DEVICE — STEERABLE SHEATH CLEAR: Brand: FARADRIVE™

## (undated) DEVICE — Device: Brand: MEDEX

## (undated) DEVICE — CATHETER CONNECTION CABLE: Brand: FARASTAR™

## (undated) DEVICE — SET PRIMARY GRVTY 10DP MALE LL 104IN

## (undated) DEVICE — PULSED FIELD ABLATION CATHETER: Brand: FARAWAVE™

## (undated) DEVICE — Device: Brand: REFERENCE PATCH CARTO 3